# Patient Record
Sex: FEMALE | Race: BLACK OR AFRICAN AMERICAN | Employment: FULL TIME | URBAN - METROPOLITAN AREA
[De-identification: names, ages, dates, MRNs, and addresses within clinical notes are randomized per-mention and may not be internally consistent; named-entity substitution may affect disease eponyms.]

---

## 2017-01-16 DIAGNOSIS — R73.03 PREDIABETES: ICD-10-CM

## 2017-01-16 RX ORDER — LANCETS
EACH MISCELLANEOUS
Qty: 200 PACKAGE | Refills: 4 | Status: SHIPPED | OUTPATIENT
Start: 2017-01-16 | End: 2018-04-03 | Stop reason: SDUPTHER

## 2017-01-26 RX ORDER — PEN NEEDLE, DIABETIC 30 GX3/16"
NEEDLE, DISPOSABLE MISCELLANEOUS
Qty: 50 PEN NEEDLE | Refills: 6 | Status: SHIPPED | OUTPATIENT
Start: 2017-01-26 | End: 2019-05-08

## 2017-01-26 NOTE — TELEPHONE ENCOUNTER
----- Message from [address] [place] [address]. Garcia sent at 1/26/2017  8:39 AM EST -----  Regarding: RE: Non-Urgent Medical Question  Contact: [phone]  I have another question. I talk to another doctor who was happy to see I was taken [place]. I told her I didn't see it working for me as I told you before but that I also get bruise from the shots. She suggested I used a longer needle. I am using BD ultra-fine pen needles Sana 4m x 32g  can you please send a larger size to [facility] to try out. I started going to the gym and is still taking this medicine but I'm not showing any change in my weight. I would like to try another size before giving up on Saxenda. The doctor said that my belly fat may be to much for the needle in so many words. ----- Message -----  From: Roddy Johnson MD  Sent: 1/20/2017  5:24 PM EST  To: Jose G Cue  Subject: RE: Non-Urgent Medical Question    Yes, if you can tolerate the pain   Just make sure that they do it aseptic    Dr. Jorden Calix    ----- Message -----     From: Nohemy Whitaker     Sent: 1/20/2017  3:44 PM EST       To: Roddy Johnson MD  Subject: Non-Urgent Medical Mary Dura year Dr. Rona Huerta, do you think it will be okay for me to get a tattoo?

## 2017-01-27 ENCOUNTER — OFFICE VISIT (OUTPATIENT)
Dept: ENDOCRINOLOGY | Age: 42
End: 2017-01-27

## 2017-01-27 VITALS
HEART RATE: 83 BPM | TEMPERATURE: 97.1 F | RESPIRATION RATE: 20 BRPM | OXYGEN SATURATION: 97 % | BODY MASS INDEX: 45.32 KG/M2 | SYSTOLIC BLOOD PRESSURE: 124 MMHG | DIASTOLIC BLOOD PRESSURE: 86 MMHG | WEIGHT: 272 LBS | HEIGHT: 65 IN

## 2017-01-27 DIAGNOSIS — E03.9 ACQUIRED HYPOTHYROIDISM: Primary | ICD-10-CM

## 2017-01-27 DIAGNOSIS — I10 ESSENTIAL HYPERTENSION: ICD-10-CM

## 2017-01-27 DIAGNOSIS — R73.03 PREDIABETES: ICD-10-CM

## 2017-01-27 DIAGNOSIS — E66.09 NON MORBID OBESITY DUE TO EXCESS CALORIES: ICD-10-CM

## 2017-01-27 DIAGNOSIS — E28.2 PCOS (POLYCYSTIC OVARIAN SYNDROME): ICD-10-CM

## 2017-01-27 RX ORDER — PEN NEEDLE, DIABETIC 29 G X1/2"
NEEDLE, DISPOSABLE MISCELLANEOUS
COMMUNITY
Start: 2017-01-25 | End: 2019-05-08 | Stop reason: ALTCHOICE

## 2017-01-27 RX ORDER — SERTRALINE HYDROCHLORIDE 25 MG/1
TABLET, FILM COATED ORAL
Refills: 5 | COMMUNITY
Start: 2016-11-15 | End: 2017-05-17 | Stop reason: DRUGHIGH

## 2017-01-27 NOTE — PROGRESS NOTES
HISTORY OF PRESENT ILLNESS   Carole Frye is a 37 y.o. female. HPI   F/u for glucose intolerance, obesity  and Pcos after sept 28 2016   Finally got  saxenda approved     Weight lost 7 lbs     Periods are regular   Not eager to get pregnant at all           Review of Systems   Constitutional: Negative. HENT: Negative. Eyes: Negative for pain and redness. Respiratory: Negative. Cardiovascular: Negative for chest pain, palpitations and leg swelling. Gastrointestinal: Negative. Negative for constipation. Genitourinary: Negative. Musculoskeletal: Negative for myalgias. Skin: Negative. Neurological: Negative. Endo/Heme/Allergies: Negative. Psychiatric/Behavioral: Negative for depression and memory loss. The patient does not have insomnia. Physical Exam   Constitutional: She is oriented to person, place, and time. She appears well-developed and well-nourished. HENT:   Head: Normocephalic. Eyes: Conjunctivae and extraocular motions are normal. Pupils are equal, round, and reactive to light. Neck: Normal range of motion. Neck supple. No JVD present. No tracheal deviation present. No thyromegaly present. Cardiovascular: Normal rate, regular rhythm and normal heart sounds. No murmur heard. Pulmonary/Chest: Breath sounds normal.   Abdominal: Soft. Bowel sounds are normal.   Musculoskeletal: Normal range of motion. Lymphadenopathy:   She has no cervical adenopathy. Neurological: She is alert and oriented to person, place, and time. She has normal reflexes. Skin: Skin is warm. Psychiatric: She has a normal mood and affect.          Lab Results  Component Value Date/Time   Hemoglobin A1c 5.8 10/27/2015 08:14 AM   Hemoglobin A1c 5.9 03/24/2015 09:04 AM   Hemoglobin A1c 5.9 07/15/2014 09:08 AM   Hemoglobin A1c, External 5.8 07/29/2016   Hemoglobin A1c, External 5.7 01/08/2016   Glucose 100 10/27/2015 08:14 AM   Glucose (POC) 135 03/17/2011 01:28 PM   Glucose  07/30/2015 04:33 PM   Microalb/Creat ratio (ug/mg creat.) 8.1 03/24/2015 09:04 AM   LDL, calculated 79 10/27/2015 08:14 AM   Creatinine 0.78 10/27/2015 08:14 AM      Lab Results  Component Value Date/Time   Cholesterol, total 176 10/27/2015 08:14 AM   HDL Cholesterol 62 10/27/2015 08:14 AM   LDL, calculated 79 10/27/2015 08:14 AM   Triglyceride 177 10/27/2015 08:14 AM       Lab Results  Component Value Date/Time   ALT 8 10/27/2015 08:14 AM   AST 15 10/27/2015 08:14 AM   Alk. phosphatase 44 10/27/2015 08:14 AM   Bilirubin, total 0.3 10/27/2015 08:14 AM             ASSESSMENT and PLAN      1. PCOS : A1C   Is  5.8 %    From July 2016 labs ;    5.8 %    From    Oct 2015  Compared to   5.2 %    From    July 2015 compared to  5.9 %  Again from  March 2015 compared to   From nov 2014 compared to   5.9 %  From July 2014 compared to  6 % from jan 2014 compared to 5.4 % from July 2013 compared to  6 % from jan 2013 ;  5.5 % from aug 2012 compared to last 5.4 % in jan 2012. Continue on  metformin 500 mg ( 2 pills- 1 gm) bid. She is not hopeful of getting  pregnant again. ( got  )  Dietary compliance she has to maintain      . 2. HTN : better controlled, on labetalol 200 mg tid, and diovan hctz 320/25  mg qd, add norvasc 5 mg a day   Infertility, secondary- s/p tubal adhesionolysis          3. Sub-clinical hypothyroidism :  changed to brand  unithroid 25 mcg 7 days a week , plus  sundays  take xtra pill      4. Obesity S/p LAP BANDING 2006  : belviq did nto help her , made it worse   Body mass index is 45.26 kg/(m^2). Juanynamita Dontasigifredo got covered       5.  Vit d def - otc supplements     > 50 % of time is spent on counseling     F/u in 6 months

## 2017-01-27 NOTE — PATIENT INSTRUCTIONS
SAXENDA  3 mg a day       Synthroid  25 mcg daily plus extra pill on sundays     Metformin 500 mg 2 pills twice a day with meals         Do not skip meals  Do not eat in between meals    Reduce carbs- pasta, rice, potatoes, bread   Do not drink juices or sodas  , CHEESE   Stop cheese, fried foods and flour products    Do not eat sugar free cookies and cakes   Do not eat peanut butter

## 2017-01-27 NOTE — PROGRESS NOTES
Wt Readings from Last 3 Encounters:   01/27/17 272 lb (123.4 kg)   09/28/16 279 lb (126.6 kg)   08/16/16 280 lb (127 kg)     Temp Readings from Last 3 Encounters:   01/27/17 97.1 °F (36.2 °C) (Oral)   09/28/16 98.2 °F (36.8 °C) (Oral)   08/16/16 98.6 °F (37 °C)     BP Readings from Last 3 Encounters:   01/27/17 124/86   09/28/16 117/77   08/16/16 120/86     Pulse Readings from Last 3 Encounters:   01/27/17 83   09/28/16 85   08/16/16 84     Lab Results   Component Value Date/Time    Hemoglobin A1c 5.8 10/27/2015 08:14 AM    Hemoglobin A1c (POC) 5.2 07/30/2015 04:41 PM    Hemoglobin A1c, External 5.8 07/29/2016

## 2017-01-27 NOTE — MR AVS SNAPSHOT
Visit Information Date & Time Provider Department Dept. Phone Encounter #  
 1/27/2017  3:45 PM Sudeep Donaldson MD [facility] [phone] [id number] Follow-up Instructions Return in about 3 months (around 4/27/2017). Upcoming Health Maintenance Date Due  
 FOOT EXAM Q1 10/22/1986 EYE EXAM RETINAL OR DILATED Q1 10/22/1986 Pneumococcal 19-64 Medium Risk (1 of 1 - PPSV23) 10/22/1995 DTaP/Tdap/Td series (1 - Tdap) 10/22/1997 PAP AKA CERVICAL CYTOLOGY 10/22/1997 MICROALBUMIN Q1 3/24/2016 INFLUENZA AGE 9 TO ADULT 8/1/2016 HEMOGLOBIN A1C Q6M 1/29/2017 LIPID PANEL Q1 7/29/2017 Allergies as of 1/27/2017  Review Complete On: 1/27/2017 By: Sudeep Donaldson MD  
  
 Severity Noted Reaction Type Reactions Pcn [Penicillins] High 10/27/2010    Swelling Reglan [Metoclopramide]  03/17/2011    Swelling Of tongue Current Immunizations  Never Reviewed No immunizations on file. Not reviewed this visit You Were Diagnosed With   
  
 Codes Comments Prediabetes    -  Primary ICD-10-CM: R73.03 
ICD-9-CM: 790.29 Non morbid obesity due to excess calories     ICD-10-CM: E66.09 
ICD-9-CM: 278.00 Acquired hypothyroidism     ICD-10-CM: E03.9 ICD-9-CM: 244.9 Essential hypertension     ICD-10-CM: I10 
ICD-9-CM: 401.9 Vitals BP Pulse Temp Resp Height(growth percentile) Weight(growth percentile) 124/86 83 97.1 °F (36.2 °C) (Oral) 20 5' 5\" (1.651 m) 272 lb (123.4 kg) LMP SpO2 BMI OB Status Smoking Status 01/27/2017 (Exact Date) 97% 45.26 kg/m2 Having regular periods Never Smoker BMI and BSA Data Body Mass Index Body Surface Area  
 45.26 kg/m 2 2.38 m 2 Preferred Pharmacy Pharmacy Name Phone [id number] [address] [phone] Your Updated Medication List  
  
   
This list is accurate as of: 1/27/17  4:35 PM.  Always use your most recent med list.  
  
  
  
  
 acyclovir 400 mg tablet Commonly known as:  ZOVIRAX Take 400 mg by mouth daily. amLODIPine 5 mg tablet Commonly known as:  Mendez Jeffery Take 1 Tab by mouth daily. cyclobenzaprine 10 mg tablet Commonly known as:  FLEXERIL Take 1 Tab by mouth as needed. FISH OIL 1,000 mg Cap Generic drug:  omega-3 fatty acids-vitamin e Take 1 Cap by mouth. glucose blood VI test strips strip Commonly known as:  Brooke Cruise Test up to 2 times daily. Dx code E11.65 * BD INSULIN PEN NEEDLE UF ORIG 29 gauge x 1/2\" Ndle Generic drug:  Insulin Needles (Disposable) * Insulin Needles (Disposable) 31 gauge x 5/16\" Ndle Commonly known as:  BD INSULIN PEN NEEDLE UF SHORT Use once daily. Dx code E66.01  
  
 labetalol 200 mg tablet Commonly known as:  Suella Cosier Take 1 Tab by mouth three (3) times daily. Lancets Misc Test up to 2 times daily. Dx code E11.65  
  
 levothyroxine 25 mcg tablet Commonly known as:  SYNTHROID Take 1 Tab by mouth Daily (before breakfast). AND AN EXTRA PILL EVERY SUNDAY * liraglutide 3 mg/0.5 mL (18 mg/3 mL) pen Commonly known as:  SAXENDA  
0.5 mL by SubCUTAneous route daily. sample * liraglutide 3 mg/0.5 mL (18 mg/3 mL) pen Commonly known as:  SAXENDA  
0.5 mL by SubCUTAneous route daily. metFORMIN 500 mg tablet Commonly known as:  GLUCOPHAGE Take 2 Tabs by mouth two (2) times daily (with meals). naproxen 500 mg tablet Commonly known as:  NAPROSYN  
TAKE 1 TABLET BY MOUTH TWICE A DAY AS NEEDED PRENATAL 1+1 PO Take  by mouth daily. sertraline 25 mg tablet Commonly known as:  ZOLOFT  
TAKE 1 TABLET EVERY DAY  
  
 traMADol 50 mg tablet Commonly known as:  ULTRAM  
TAKE 1 TABLET BY MOUTH EVERY 4 HOURS AS NEEDED FOR PAIN  
  
 valsartan-hydroCHLOROthiazide 320-25 mg per tablet Commonly known as:  DIOVAN-HCT Take 1 Tab by mouth daily. * Notice: This list has 4 medication(s) that are the same as other medications prescribed for you. Read the directions carefully, and ask your doctor or other care provider to review them with you. Follow-up Instructions Return in about 3 months (around 4/27/2017). Patient Instructions SAXENDA  3 mg a day Synthroid  25 mcg daily plus extra pill on sundays Metformin 500 mg 2 pills twice a day with meals Do not skip meals Do not eat in between meals Reduce carbs- pasta, rice, potatoes, bread Do not drink juices or sodas  , CHEESE Stop cheese, fried foods and flour products Do not eat sugar free cookies and cakes Do not eat peanut butter Introducing [facility] & HEALTH SERVICES! Dear Kusum Escalera: Thank you for requesting a Medisse account. Our records indicate that you already have an active Medisse account. You can access your account anytime at [email or web address] Did you know that you can access your hospital and ER discharge instructions at any time in Medisse? You can also review all of your test results from your hospital stay or ER visit. Additional Information If you have questions, please visit the Frequently Asked Questions section of the Medisse website at [email or web address] Remember, Medisse is NOT to be used for urgent needs. For medical emergencies, dial 911. Now available from your iPhone and Android! Please provide this summary of care documentation to your next provider. Your primary care clinician is listed as Emil Gipson. If you have any questions after today's visit, please call [phone].

## 2017-01-28 LAB
EST. AVERAGE GLUCOSE BLD GHB EST-MCNC: 120 MG/DL
HBA1C MFR BLD: 5.8 % (ref 4.8–5.6)
T4 FREE SERPL-MCNC: 1.11 NG/DL (ref 0.82–1.77)
TSH SERPL DL<=0.005 MIU/L-ACNC: 2.43 UIU/ML (ref 0.45–4.5)

## 2017-02-01 NOTE — TELEPHONE ENCOUNTER
----- Message from [address] [place] [address]. Jose sent at 2/1/2017  8:40 AM EST -----  Regarding: Prescription Question  Contact: [phone]  Good morning,  Please send a prescription for my test strips for the Verio One touch meter. Thank you   Tobias Nava.

## 2017-04-03 ENCOUNTER — OFFICE VISIT (OUTPATIENT)
Dept: SURGERY | Age: 42
End: 2017-04-03

## 2017-04-03 VITALS
DIASTOLIC BLOOD PRESSURE: 90 MMHG | OXYGEN SATURATION: 98 % | HEART RATE: 80 BPM | SYSTOLIC BLOOD PRESSURE: 140 MMHG | HEIGHT: 65 IN | BODY MASS INDEX: 47.15 KG/M2 | WEIGHT: 283 LBS | TEMPERATURE: 98.7 F | RESPIRATION RATE: 20 BRPM

## 2017-04-03 DIAGNOSIS — R63.5 WEIGHT GAIN: ICD-10-CM

## 2017-04-03 DIAGNOSIS — Z46.51 ADMISSION FOR ADJUSTMENT OF GASTRIC LAP BAND: Primary | ICD-10-CM

## 2017-04-03 DIAGNOSIS — E66.01 MORBID OBESITY WITH BMI OF 45.0-49.9, ADULT (HCC): ICD-10-CM

## 2017-04-03 NOTE — PATIENT INSTRUCTIONS
Consider attending an informational seminar about gastric bypass. If you are interested, e-mail jia Donahue [email or web address] an appointment with the dietician, please call or email   Charity Cruz RD at:    [phone]  [email or web address]      Juan Manuel [facility]  [phone]      Post Lap Band Adjustment Instructions    Your band is now more restrictive. Some swelling can occur in the band following a fill. Therefore, you should eat :    Full liquids for 12-24 hours  Soft & mushy foods for 2 days  Resume regular food on the 4th day. ** All meals should fit in a 4 ounce cup. (1/2 cup container) **  Choose foods that require chewing, ideally foods rich in fiber & protein. Avoid fatty & sugary foods. Avoid snack food items. Eating tips:    Put down your fork between bites. Do not talk and eat at the same time. No drinking 30 minutes before or one hour after a meal.    ** Call for an evaluation if you develop new reflux (heartburn), a night time cough or inability to tolerate solids foods. **    Your next regular follow-up appointment is in: 4 to 6 weeks. Please call the office at [phone] to schedule this appointment. If you have any problems before your scheduled appointment, don't wait. Call the office when you are having the problem. They may need to see you before your scheduled appointment.
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
## 2017-04-03 NOTE — PROGRESS NOTES
1. Have you been to the ER, urgent care clinic since your last visit? Hospitalized since your last visit? No    2. Have you seen or consulted any other health care providers outside of the [address] [place] [address] since your last visit? Include any pap smears or colon screening.  No

## 2017-04-03 NOTE — LETTER
4/3/2017 3:52 PM 
 
Ms. Kiran Mckoy [phone] [place] To Whom It May Concern: 
 
Kiran Mckoy is under the care of [facility] [phone]. Due to medical issues and prior surgery, she may require more frequent bathroom visits during the work day. If there are questions or concerns please have the patient contact our office.  
 
Sincerely, 
 
 
Amanda Bejarano NP

## 2017-04-03 NOTE — MR AVS SNAPSHOT
Visit Information Date & Time Provider Department Dept. Phone Encounter #  
 4/3/2017  3:00 PM Delmi Mccray NP [facility] GENERAL SURGERY [phone] [id number] Your Appointments 5/8/2017  3:45 PM  
ROUTINE CARE with Nolberto Howell MD  
[facility]) Appt Note: 3 mo fu  
 [address] [facility] [address]  
[phone]  
  
   
 [address] [place] [address] Upcoming Health Maintenance Date Due  
 FOOT EXAM Q1 10/22/1986 EYE EXAM RETINAL OR DILATED Q1 10/22/1986 Pneumococcal 19-64 Medium Risk (1 of 1 - PPSV23) 10/22/1995 DTaP/Tdap/Td series (1 - Tdap) 10/22/1997 PAP AKA CERVICAL CYTOLOGY 10/22/1997 MICROALBUMIN Q1 3/24/2016 INFLUENZA AGE 9 TO ADULT 8/1/2016 HEMOGLOBIN A1C Q6M 7/27/2017 LIPID PANEL Q1 7/29/2017 Allergies as of 4/3/2017  Review Complete On: 4/3/2017 By: Lauren Neil LPN Severity Noted Reaction Type Reactions Pcn [Penicillins] High 10/27/2010    Swelling Hydralazine  04/03/2017    Other (comments) Lupus reaction Reglan [Metoclopramide]  03/17/2011    Swelling Of tongue Current Immunizations  Never Reviewed No immunizations on file. Not reviewed this visit Vitals BP Pulse Temp Resp Height(growth percentile) Weight(growth percentile) 140/90 (BP 1 Location: Right arm, BP Patient Position: Sitting) 80 98.7 °F (37.1 °C) 20 5' 5\" (1.651 m) 283 lb (128.4 kg) SpO2 BMI OB Status Smoking Status 98% 47.09 kg/m2 Having regular periods Never Smoker BMI and BSA Data Body Mass Index Body Surface Area 47.09 kg/m 2 2.43 m 2 Preferred Pharmacy Pharmacy Name Phone [id number] Latesha ISA Nicole - [phone] [phone] Your Updated Medication List  
  
   
This list is accurate as of: 4/3/17  3:49 PM.  Always use your most recent med list.  
  
  
  
  
 acyclovir 400 mg tablet Commonly known as:  ZOVIRAX Take 400 mg by mouth daily. amLODIPine 5 mg tablet Commonly known as:  Cecilia Medici Take 1 Tab by mouth daily. cyclobenzaprine 10 mg tablet Commonly known as:  FLEXERIL Take 1 Tab by mouth as needed. FISH OIL 1,000 mg Cap Generic drug:  omega-3 fatty acids-vitamin e Take 1 Cap by mouth. glucose blood VI test strips strip Commonly known as:  Heather Adam Test up to 2 times daily. Dx code E11.65 * BD INSULIN PEN NEEDLE UF ORIG 29 gauge x 1/2\" Ndle Generic drug:  Insulin Needles (Disposable) * Insulin Needles (Disposable) 31 gauge x 5/16\" Ndle Commonly known as:  BD INSULIN PEN NEEDLE UF SHORT Use once daily. Dx code E66.01  
  
 labetalol 200 mg tablet Commonly known as:  Niall Gosling Take 1 Tab by mouth three (3) times daily. Lancets Misc Test up to 2 times daily. Dx code E11.65  
  
 levothyroxine 25 mcg tablet Commonly known as:  SYNTHROID Take 1 Tab by mouth Daily (before breakfast). AND AN EXTRA PILL EVERY SUNDAY  
  
 liraglutide 3 mg/0.5 mL (18 mg/3 mL) pen Commonly known as:  SAXENDA  
0.5 mL by SubCUTAneous route daily. sample  
  
 metFORMIN 500 mg tablet Commonly known as:  GLUCOPHAGE Take 2 Tabs by mouth two (2) times daily (with meals). naproxen 500 mg tablet Commonly known as:  NAPROSYN  
TAKE 1 TABLET BY MOUTH TWICE A DAY AS NEEDED PRENATAL 1+1 PO Take  by mouth daily. sertraline 25 mg tablet Commonly known as:  ZOLOFT  
TAKE 1 TABLET EVERY DAY  
  
 traMADol 50 mg tablet Commonly known as:  ULTRAM  
TAKE 1 TABLET BY MOUTH EVERY 4 HOURS AS NEEDED FOR PAIN  
  
 valsartan-hydroCHLOROthiazide 320-25 mg per tablet Commonly known as:  DIOVAN-HCT Take 1 Tab by mouth daily. * Notice: This list has 2 medication(s) that are the same as other medications prescribed for you.  Read the directions carefully, and ask your doctor or other care provider to review them with you. Patient Instructions Consider attending an informational seminar about gastric bypass. If you are interested, e-mail [email or web address] [place] an appointment with the dietician, please call or email Lizzeth СветланаANDER at: 
 
[phone] [email or web address] [facility] Surgery at [facility] 
[phone] Post Lap Band Adjustment Instructions Your band is now more restrictive. Some swelling can occur in the band following a fill. Therefore, you should eat : 
 
Full liquids for 12-24 hours Soft & mushy foods for 2 days Resume regular food on the 4th day. ** All meals should fit in a 4 ounce cup. (1/2 cup container) ** Choose foods that require chewing, ideally foods rich in fiber & protein. Avoid fatty & sugary foods. Avoid snack food items. Eating tips: 
 
Put down your fork between bites. Do not talk and eat at the same time. No drinking 30 minutes before or one hour after a meal. 
 
** Call for an evaluation if you develop new reflux (heartburn), a night time cough or inability to tolerate solids foods. ** Your next regular follow-up appointment is in: 4 to 6 weeks. Please call the office at [phone] to schedule this appointment. If you have any problems before your scheduled appointment, don't wait. Call the office when you are having the problem. They may need to see you before your scheduled appointment. Introducing [facility] & HEALTH SERVICES! Steph Cole: Thank you for requesting a CyberSettle account. Our records indicate that you already have an active CyberSettle account. You can access your account anytime at [email or web address] Did you know that you can access your hospital and ER discharge instructions at any time in CyberSettle? You can also review all of your test results from your hospital stay or ER visit. Additional Information If you have questions, please visit the Frequently Asked Questions section of the [employer] website at [email or web address] Remember, Empiribox is NOT to be used for urgent needs. For medical emergencies, dial 911. Now available from your iPhone and Android! Please provide this summary of care documentation to your next provider. Your primary care clinician is listed as Kike Suarez. If you have any questions after today's visit, please call [phone].

## 2017-05-17 ENCOUNTER — OFFICE VISIT (OUTPATIENT)
Dept: ENDOCRINOLOGY | Age: 42
End: 2017-05-17

## 2017-05-17 VITALS
HEIGHT: 65 IN | WEIGHT: 279 LBS | SYSTOLIC BLOOD PRESSURE: 139 MMHG | RESPIRATION RATE: 20 BRPM | HEART RATE: 73 BPM | BODY MASS INDEX: 46.48 KG/M2 | TEMPERATURE: 97.5 F | DIASTOLIC BLOOD PRESSURE: 91 MMHG | OXYGEN SATURATION: 97 %

## 2017-05-17 DIAGNOSIS — R73.03 PREDIABETES: ICD-10-CM

## 2017-05-17 DIAGNOSIS — E28.2 PCOS (POLYCYSTIC OVARIAN SYNDROME): Primary | ICD-10-CM

## 2017-05-17 DIAGNOSIS — E66.01 MORBID OBESITY DUE TO EXCESS CALORIES (HCC): ICD-10-CM

## 2017-05-17 DIAGNOSIS — I10 ESSENTIAL HYPERTENSION: ICD-10-CM

## 2017-05-17 DIAGNOSIS — E28.2 PCOS (POLYCYSTIC OVARIAN SYNDROME): ICD-10-CM

## 2017-05-17 RX ORDER — LEVOTHYROXINE SODIUM 50 UG/1
TABLET ORAL
Qty: 90 TAB | Refills: 4 | Status: SHIPPED | OUTPATIENT
Start: 2017-05-17 | End: 2018-04-03 | Stop reason: SDUPTHER

## 2017-05-17 RX ORDER — SERTRALINE HYDROCHLORIDE 50 MG/1
1 TABLET, FILM COATED ORAL DAILY
COMMUNITY
Start: 2017-05-15

## 2017-05-17 RX ORDER — METFORMIN HYDROCHLORIDE 500 MG/1
1000 TABLET ORAL 2 TIMES DAILY WITH MEALS
Qty: 360 TAB | Refills: 4 | Status: SHIPPED | OUTPATIENT
Start: 2017-05-17 | End: 2018-04-03 | Stop reason: SDUPTHER

## 2017-05-17 NOTE — MR AVS SNAPSHOT
Visit Information Date & Time Provider Department Dept. Phone Encounter #  
 5/17/2017 11:45 AM Kamla Mcclure MD [facility] [phone] [id number] Follow-up Instructions Return in about 4 months (around 9/17/2017). Upcoming Health Maintenance Date Due DTaP/Tdap/Td series (1 - Tdap) 10/22/1997 PAP AKA CERVICAL CYTOLOGY 10/22/1997 INFLUENZA AGE 9 TO ADULT 8/1/2017 Allergies as of 5/17/2017  Review Complete On: 5/17/2017 By: Kamla Mcclure MD  
  
 Severity Noted Reaction Type Reactions Pcn [Penicillins] High 10/27/2010    Swelling Hydralazine  04/03/2017    Other (comments) Lupus reaction Reglan [Metoclopramide]  03/17/2011    Swelling Of tongue Current Immunizations  Never Reviewed No immunizations on file. Not reviewed this visit You Were Diagnosed With   
  
 Codes Comments PCOS (polycystic ovarian syndrome)    -  Primary ICD-10-CM: E28.2 ICD-9-CM: 256.4 Essential hypertension     ICD-10-CM: I10 
ICD-9-CM: 401.9 Morbid obesity due to excess calories (HCC)     ICD-10-CM: E66.01 
ICD-9-CM: 278.01 Prediabetes     ICD-10-CM: R73.03 
ICD-9-CM: 790.29 Vitals BP Pulse Temp Resp Height(growth percentile) Weight(growth percentile) (!) 139/91 73 97.5 °F (36.4 °C) (Oral) 20 5' 5\" (1.651 m) 279 lb (126.6 kg) LMP SpO2 BMI OB Status Smoking Status 05/08/2017 (Exact Date) 97% 46.43 kg/m2 Having regular periods Never Smoker BMI and BSA Data Body Mass Index Body Surface Area  
 46.43 kg/m 2 2.41 m 2 Preferred Pharmacy Pharmacy Name Phone [id number] [address] [phone] Your Updated Medication List  
  
   
This list is accurate as of: 5/17/17 12:36 PM.  Always use your most recent med list.  
  
  
  
  
 acyclovir 400 mg tablet Commonly known as:  ZOVIRAX Take 400 mg by mouth daily. amLODIPine 5 mg tablet Commonly known as:  Sameul Crow Take 1 Tab by mouth daily. cyclobenzaprine 10 mg tablet Commonly known as:  FLEXERIL Take 1 Tab by mouth as needed. glucose blood VI test strips strip Commonly known as:  Tiffany Benavides Test up to 2 times daily. Dx code E11.65 * BD INSULIN PEN NEEDLE UF ORIG 29 gauge x 1/2\" Ndle Generic drug:  Insulin Needles (Disposable) * Insulin Needles (Disposable) 31 gauge x 5/16\" Ndle Commonly known as:  BD INSULIN PEN NEEDLE UF SHORT Use once daily. Dx code E66.01  
  
 labetalol 200 mg tablet Commonly known as:  Alberto Rene Take 1 Tab by mouth three (3) times daily. Lancets Misc Test up to 2 times daily. Dx code E11.65  
  
 levothyroxine 25 mcg tablet Commonly known as:  SYNTHROID Take 1 Tab by mouth Daily (before breakfast). AND AN EXTRA PILL EVERY SUNDAY  
  
 liraglutide 3 mg/0.5 mL (18 mg/3 mL) pen Commonly known as:  SAXENDA  
0.5 mL by SubCUTAneous route daily. sample  
  
 metFORMIN 500 mg tablet Commonly known as:  GLUCOPHAGE Take 2 Tabs by mouth two (2) times daily (with meals). naproxen 500 mg tablet Commonly known as:  NAPROSYN  
TAKE 1 TABLET BY MOUTH TWICE A DAY AS NEEDED PRENATAL 1+1 PO Take  by mouth daily. sertraline 50 mg tablet Commonly known as:  ZOLOFT Take 1 Tab by mouth daily. traMADol 50 mg tablet Commonly known as:  ULTRAM  
TAKE 1 TABLET BY MOUTH EVERY 4 HOURS AS NEEDED FOR PAIN  
  
 valsartan-hydroCHLOROthiazide 320-25 mg per tablet Commonly known as:  DIOVAN-HCT Take 1 Tab by mouth daily. * Notice: This list has 2 medication(s) that are the same as other medications prescribed for you. Read the directions carefully, and ask your doctor or other care provider to review them with you. Follow-up Instructions Return in about 4 months (around 9/17/2017). Patient Instructions SAXENDA  3 mg a day Increase  Synthroid  50 mcg daily except sundays Metformin 500 mg 2 pills twice a day with meals Do not skip meals Do not eat in between meals Reduce carbs- pasta, rice, potatoes, bread Do not drink juices or sodas  , CHEESE Stop cheese, fried foods and flour products Do not eat sugar free cookies and cakes Do not eat peanut butter Introducing [facility]! Dear Orly Wells: Thank you for requesting a [employer] account. Our records indicate that you already have an active [employer] account. You can access your account anytime at [email or web address] Did you know that you can access your hospital and ER discharge instructions at any time in [employer]? You can also review all of your test results from your hospital stay or ER visit. Additional Information If you have questions, please visit the Frequently Asked Questions section of the [employer] website at [email or web address] Remember, [employer] is NOT to be used for urgent needs. For medical emergencies, dial 911. Now available from your iPhone and Android! Please provide this summary of care documentation to your next provider. Your primary care clinician is listed as Kelsey Delgado. If you have any questions after today's visit, please call [phone].

## 2017-05-17 NOTE — PATIENT INSTRUCTIONS
SAXENDA  3 mg a day       Increase  Synthroid  50 mcg daily except sundays     Metformin 500 mg 2 pills twice a day with meals         Do not skip meals  Do not eat in between meals    Reduce carbs- pasta, rice, potatoes, bread   Do not drink juices or sodas  , CHEESE   Stop cheese, fried foods and flour products    Do not eat sugar free cookies and cakes   Do not eat peanut butter

## 2017-05-17 NOTE — PROGRESS NOTES
HISTORY OF PRESENT ILLNESS   Jackelyn Martinez is a 37 y.o. female. HPI   F/u for glucose intolerance, obesity  and Pcos after jan 2017   Finally got  saxenda approved , but she stopped     Weight lost 3 lbs     Periods are regular   Not eager to get pregnant at all           Review of Systems   Constitutional: Negative. HENT: Negative. Eyes: Negative for pain and redness. Respiratory: Negative. Cardiovascular: Negative for chest pain, palpitations and leg swelling. Gastrointestinal: Negative. Negative for constipation. Genitourinary: Negative. Musculoskeletal: Negative for myalgias. Skin: Negative. Neurological: Negative. Endo/Heme/Allergies: Negative. Psychiatric/Behavioral: Negative for depression and memory loss. The patient does not have insomnia. Physical Exam   Constitutional: She is oriented to person, place, and time. She appears well-developed and well-nourished. HENT:   Head: Normocephalic. Eyes: Conjunctivae and extraocular motions are normal. Pupils are equal, round, and reactive to light. Neck: Normal range of motion. Neck supple. No JVD present. No tracheal deviation present. No thyromegaly present. Cardiovascular: Normal rate, regular rhythm and normal heart sounds. No murmur heard. Pulmonary/Chest: Breath sounds normal.   Abdominal: Soft. Bowel sounds are normal.   Musculoskeletal: Normal range of motion. Lymphadenopathy:   She has no cervical adenopathy. Neurological: She is alert and oriented to person, place, and time. She has normal reflexes. Skin: Skin is warm. Psychiatric: She has a normal mood and affect.          Lab Results   Component Value Date/Time    Hemoglobin A1c 5.9 05/12/2017 09:50 AM    Hemoglobin A1c 5.8 01/27/2017 12:07 PM    Hemoglobin A1c 5.8 10/27/2015 08:14 AM    Hemoglobin A1c, External 5.8 07/29/2016    Hemoglobin A1c, External 5.7 01/08/2016    Glucose 101 05/12/2017 09:50 AM    Glucose (POC) 135 03/17/2011 01:28 PM Glucose  07/30/2015 04:33 PM    Microalb/Creat ratio (ug/mg creat.) 5.7 05/12/2017 09:50 AM    LDL, calculated 98 05/12/2017 09:50 AM    Creatinine 0.70 05/12/2017 09:50 AM      Lab Results   Component Value Date/Time    Cholesterol, total 183 05/12/2017 09:50 AM    HDL Cholesterol 58 05/12/2017 09:50 AM    LDL, calculated 98 05/12/2017 09:50 AM    Triglyceride 136 05/12/2017 09:50 AM       Lab Results   Component Value Date/Time    ALT (SGPT) 13 05/12/2017 09:50 AM    AST (SGOT) 12 05/12/2017 09:50 AM    Alk. phosphatase 54 05/12/2017 09:50 AM    Bilirubin, total 0.2 05/12/2017 09:50 AM             ASSESSMENT and PLAN      1. PCOS : A1C   Is  5.9 %     From   May 2017      5.8 %    From July 2016 labs ;    5.8 %    From    Oct 2015  Compared to   5.2 %    From    July 2015 compared to  5.9 %  Again from  March 2015 compared to   From nov 2014 compared to   5.9 %  From July 2014 compared to  6 % from jan 2014 compared to 5.4 % from July 2013 compared to  6 % from jan 2013 ;  5.5 % from aug 2012 compared to last 5.4 % in jan 2012. Continue on  metformin 500 mg ( 2 pills- 1 gm) bid. She is not hopeful of getting  pregnant again. ( got  )  Dietary compliance she has to maintain      . 2. HTN : better controlled, on labetalol 200 mg tid, and diovan hctz 320/25  mg qd, add norvasc 5 mg a day   Infertility, secondary- s/p tubal adhesionolysis          3. Sub-clinical hypothyroidism :  Changing to  Synthroid 50 mcg 6 days a week       4. Obesity S/p LAP BANDING 2006  : belviq did nto help her , made it worse   Body mass index is 46.43 kg/(m^2). Truong Dickson got covered   She is not using it as she had some bruises   Asked her to stop fish oil supplements      5.  Vit d def - otc supplements     > 50 % of time is spent on counseling     F/u in 6 months

## 2017-05-17 NOTE — PROGRESS NOTES
Wt Readings from Last 3 Encounters:   05/17/17 279 lb (126.6 kg)   04/03/17 283 lb (128.4 kg)   01/27/17 272 lb (123.4 kg)     Temp Readings from Last 3 Encounters:   05/17/17 97.5 °F (36.4 °C) (Oral)   04/03/17 98.7 °F (37.1 °C)   01/27/17 97.1 °F (36.2 °C) (Oral)     BP Readings from Last 3 Encounters:   05/17/17 (!) 139/91   04/03/17 140/90   01/27/17 124/86     Pulse Readings from Last 3 Encounters:   05/17/17 73   04/03/17 80   01/27/17 83     Lab Results   Component Value Date/Time    Hemoglobin A1c 5.9 05/12/2017 09:50 AM    Hemoglobin A1c (POC) 5.2 07/30/2015 04:41 PM    Hemoglobin A1c, External 5.8 07/29/2016     Last Podiatry March 2017  Last Eye exam 2017

## 2017-09-18 ENCOUNTER — OFFICE VISIT (OUTPATIENT)
Dept: ENDOCRINOLOGY | Age: 42
End: 2017-09-18

## 2017-09-18 VITALS
TEMPERATURE: 98.1 F | HEIGHT: 65 IN | RESPIRATION RATE: 20 BRPM | HEART RATE: 71 BPM | DIASTOLIC BLOOD PRESSURE: 96 MMHG | WEIGHT: 288 LBS | SYSTOLIC BLOOD PRESSURE: 144 MMHG | OXYGEN SATURATION: 98 % | BODY MASS INDEX: 47.98 KG/M2

## 2017-09-18 DIAGNOSIS — E03.9 ACQUIRED HYPOTHYROIDISM: ICD-10-CM

## 2017-09-18 DIAGNOSIS — E11.65 TYPE 2 DIABETES MELLITUS WITH HYPERGLYCEMIA, WITHOUT LONG-TERM CURRENT USE OF INSULIN (HCC): ICD-10-CM

## 2017-09-18 DIAGNOSIS — E28.2 PCOS (POLYCYSTIC OVARIAN SYNDROME): Primary | ICD-10-CM

## 2017-09-18 RX ORDER — NORETHINDRONE ACETATE AND ETHINYL ESTRADIOL, ETHINYL ESTRADIOL AND FERROUS FUMARATE 1MG-10(24)
1 KIT ORAL DAILY
Refills: 4 | COMMUNITY
Start: 2017-08-21 | End: 2018-04-03 | Stop reason: ALTCHOICE

## 2017-09-18 NOTE — PATIENT INSTRUCTIONS
STOP      SAXENDA  3 mg a day     Q-symia   7.5 - 46   Mg    For first month     11.25 - 93 mg    Second  month on. ..     Synthroid  50 mcg daily except sundays   Metformin 500 mg 2 pills twice a day with meals     =---------------------------------------------------------------------    Low carbs diet   30 gm per  Meal         ---------------------------------------------------------------------------------------------------------        Do not skip meals  Do not eat in between meals    Reduce carbs- pasta, rice, potatoes, bread   Do not drink juices or sodas  , CHEESE   Stop cheese, fried foods and flour products    Do not eat sugar free cookies and cakes   Do not eat peanut butter

## 2017-09-18 NOTE — PROGRESS NOTES
Wt Readings from Last 3 Encounters:   09/18/17 288 lb (130.6 kg)   05/17/17 279 lb (126.6 kg)   04/03/17 283 lb (128.4 kg)     Temp Readings from Last 3 Encounters:   09/18/17 98.1 °F (36.7 °C) (Oral)   05/17/17 97.5 °F (36.4 °C) (Oral)   04/03/17 98.7 °F (37.1 °C)     BP Readings from Last 3 Encounters:   09/18/17 (!) 144/96   05/17/17 (!) 139/91   04/03/17 140/90     Pulse Readings from Last 3 Encounters:   09/18/17 71   05/17/17 73   04/03/17 80     Lab Results   Component Value Date/Time    Hemoglobin A1c 5.9 05/12/2017 09:50 AM    Hemoglobin A1c (POC) 5.2 07/30/2015 04:41 PM    Hemoglobin A1c, External 5.8 07/29/2016

## 2017-09-18 NOTE — MR AVS SNAPSHOT
Visit Information Date & Time Provider Department Dept. Phone Encounter #  
 9/18/2017  3:45 PM Claudene Maffucci, MD [facility] [phone] [id number] Follow-up Instructions Return in about 4 months (around 1/18/2018). Upcoming Health Maintenance Date Due DTaP/Tdap/Td series (1 - Tdap) 10/22/1997 PAP AKA CERVICAL CYTOLOGY 10/22/1997 INFLUENZA AGE 9 TO ADULT 8/1/2017 Allergies as of 9/18/2017  Review Complete On: 9/18/2017 By: Claudene Maffucci, MD  
  
 Severity Noted Reaction Type Reactions Pcn [Penicillins] High 10/27/2010    Swelling Hydralazine  04/03/2017    Other (comments) Lupus reaction Reglan [Metoclopramide]  03/17/2011    Swelling Of tongue Current Immunizations  Never Reviewed No immunizations on file. Not reviewed this visit You Were Diagnosed With   
  
 Codes Comments PCOS (polycystic ovarian syndrome)    -  Primary ICD-10-CM: E28.2 ICD-9-CM: 256.4 Type 2 diabetes mellitus with hyperglycemia, without long-term current use of insulin (HCC)     ICD-10-CM: E11.65 ICD-9-CM: 250.00, 790.29 Acquired hypothyroidism     ICD-10-CM: E03.9 ICD-9-CM: 552. 9 Vitals BP Pulse Temp Resp Height(growth percentile) Weight(growth percentile) (!) 144/96 71 98.1 °F (36.7 °C) (Oral) 20 5' 5\" (1.651 m) 288 lb (130.6 kg) LMP SpO2 BMI OB Status Smoking Status 09/11/2017 (Exact Date) 98% 47.93 kg/m2 Having regular periods Never Smoker BMI and BSA Data Body Mass Index Body Surface Area  
 47.93 kg/m 2 2.45 m 2 Preferred Pharmacy Pharmacy Name Phone [id number] Radha Al[address] [phone] Your Updated Medication List  
  
   
This list is accurate as of: 9/18/17  5:16 PM.  Always use your most recent med list.  
  
  
  
  
 acyclovir 400 mg tablet Commonly known as:  ZOVIRAX Take 400 mg by mouth daily. amLODIPine 5 mg tablet Commonly known as:  Berry Barges Take 1 Tab by mouth daily. cyclobenzaprine 10 mg tablet Commonly known as:  FLEXERIL Take 1 Tab by mouth as needed. glucose blood VI test strips strip Commonly known as:  Jorje [place] Test up to 2 times daily. Dx code E11.65 * BD INSULIN PEN NEEDLE UF ORIG 29 gauge x 1/2\" Ndle Generic drug:  Insulin Needles (Disposable) * Insulin Needles (Disposable) 31 gauge x 5/16\" Ndle Commonly known as:  BD INSULIN PEN NEEDLE UF SHORT Use once daily. Dx code E66.01  
  
 labetalol 200 mg tablet Commonly known as:  Gina Emirati Take 1 Tab by mouth three (3) times daily. Lancets Misc Test up to 2 times daily. Dx code E11.65  
  
 levothyroxine 50 mcg tablet Commonly known as:  SYNTHROID Take 1 tablet daily, except on Sundays. Stop 25 mcg dose * liraglutide 3 mg/0.5 mL (18 mg/3 mL) pen Commonly known as:  SAXENDA  
0.5 mL by SubCUTAneous route daily. sample * liraglutide 3 mg/0.5 mL (18 mg/3 mL) pen Commonly known as:  SAXENDA  
0.5 mL by SubCUTAneous route daily. sample LO LOESTRIN FE 1 mg-10 mcg (24)/10 mcg (2) Tab Generic drug:  norethindrone-e.estradiol-iron Take 1 Tab by mouth daily. metFORMIN 500 mg tablet Commonly known as:  GLUCOPHAGE Take 2 Tabs by mouth two (2) times daily (with meals). naproxen 500 mg tablet Commonly known as:  NAPROSYN  
TAKE 1 TABLET BY MOUTH TWICE A DAY AS NEEDED  
  
 phentermine-topiramate 7.5-46 mg Cm24 Commonly known as:  QSYMIA Take 1 Tab by mouth daily. Max Daily Amount: 1 Tab. PRENATAL 1+1 PO Take  by mouth daily. sertraline 50 mg tablet Commonly known as:  ZOLOFT Take 1 Tab by mouth daily. traMADol 50 mg tablet Commonly known as:  ULTRAM  
TAKE 1 TABLET BY MOUTH EVERY 4 HOURS AS NEEDED FOR PAIN  
  
 valsartan-hydroCHLOROthiazide 320-25 mg per tablet Commonly known as:  DIOVAN-HCT Take 1 Tab by mouth daily. * Notice: This list has 4 medication(s) that are the same as other medications prescribed for you. Read the directions carefully, and ask your doctor or other care provider to review them with you. Prescriptions Printed Refills  
 phentermine-topiramate (QSYMIA) 7.5-46 mg CM24 6 Sig: Take 1 Tab by mouth daily. Max Daily Amount: 1 Tab. Class: Print Route: Oral  
  
Follow-up Instructions Return in about 4 months (around 1/18/2018). To-Do List   
 09/18/2017 Lab:  HEMOGLOBIN A1C WITH EAG   
  
 09/18/2017 Lab:  LIPID PANEL   
  
 09/18/2017 Lab:  METABOLIC PANEL, COMPREHENSIVE   
  
 09/18/2017 Lab:  TSH 3RD GENERATION Patient Instructions STOP      SAXENDA  3 mg a day Chao Beck 7.5 - 46   Mg    For first month    
11.25 - 93 mg    Second  month on. .. Synthroid  50 mcg daily except sundays Metformin 500 mg 2 pills twice a day with meals =--------------------------------------------------------------------- Low carbs diet 30 gm per  Meal  
 
 
 
--------------------------------------------------------------------------------------------------------- Do not skip meals Do not eat in between meals Reduce carbs- pasta, rice, potatoes, bread Do not drink juices or sodas  , CHEESE Stop cheese, fried foods and flour products Do not eat sugar free cookies and cakes Do not eat peanut butter [facility]! Dear Jade Hamilton: Thank you for requesting a [employer] account. Our records indicate that you already have an active [employer] account. You can access your account anytime at [email or web address] Did you know that you can access your hospital and ER discharge instructions at any time in [employer]? You can also review all of your test results from your hospital stay or ER visit. Additional Information If you have questions, please visit the Frequently Asked Questions section of the [email or web address] website at [email or web address] Remember, [email or web address] is NOT to be used for urgent needs. For medical emergencies, dial 911. Now available from your iPhone and Android! Please provide this summary of care documentation to your next provider. Your primary care clinician is listed as Lissa Forbes. If you have any questions after today's visit, please call [phone].

## 2017-09-18 NOTE — PROGRESS NOTES
HISTORY OF PRESENT ILLNESS   Juan Loera is a 37 y.o. female. HPI   F/u for glucose intolerance, obesity  and Pcos after May  2017   Finally got  saxenda approved , but she stopped     Weight gain of    8    lbs     Periods are regular   Not eager to get pregnant at all           Review of Systems   Constitutional: Negative. HENT: Negative. Eyes: Negative for pain and redness. Respiratory: Negative. Cardiovascular: Negative for chest pain, palpitations and leg swelling. Gastrointestinal: Negative. Negative for constipation. Genitourinary: Negative. Musculoskeletal: Negative for myalgias. Skin: Negative. Neurological: Negative. Endo/Heme/Allergies: Negative. Psychiatric/Behavioral: Negative for depression and memory loss. The patient does not have insomnia. Physical Exam   Constitutional: She is oriented to person, place, and time. She appears well-developed and well-nourished. HENT:   Head: Normocephalic. Eyes: Conjunctivae and extraocular motions are normal. Pupils are equal, round, and reactive to light. Neck: Normal range of motion. Neck supple. No JVD present. No tracheal deviation present. No thyromegaly present. Cardiovascular: Normal rate, regular rhythm and normal heart sounds. No murmur heard. Pulmonary/Chest: Breath sounds normal.   Abdominal: Soft. Bowel sounds are normal.   Musculoskeletal: Normal range of motion. Lymphadenopathy:   She has no cervical adenopathy. Neurological: She is alert and oriented to person, place, and time. She has normal reflexes. Skin: Skin is warm. Psychiatric: She has a normal mood and affect.          Lab Results   Component Value Date/Time    Hemoglobin A1c 5.9 05/12/2017 09:50 AM    Hemoglobin A1c 5.8 01/27/2017 12:07 PM    Hemoglobin A1c 5.8 10/27/2015 08:14 AM    Hemoglobin A1c, External 5.8 07/29/2016    Hemoglobin A1c, External 5.7 01/08/2016    Glucose 101 05/12/2017 09:50 AM    Glucose (POC) 135 03/17/2011 01:28 PM Glucose  07/30/2015 04:33 PM    Microalb/Creat ratio (ug/mg creat.) 5.7 05/12/2017 09:50 AM    LDL, calculated 98 05/12/2017 09:50 AM    Creatinine 0.70 05/12/2017 09:50 AM      Lab Results   Component Value Date/Time    Cholesterol, total 183 05/12/2017 09:50 AM    HDL Cholesterol 58 05/12/2017 09:50 AM    LDL, calculated 98 05/12/2017 09:50 AM    Triglyceride 136 05/12/2017 09:50 AM       Lab Results   Component Value Date/Time    ALT (SGPT) 13 05/12/2017 09:50 AM    AST (SGOT) 12 05/12/2017 09:50 AM    Alk. phosphatase 54 05/12/2017 09:50 AM    Bilirubin, total 0.2 05/12/2017 09:50 AM             ASSESSMENT and PLAN      1. PCOS : A1C   Is    ?? Await labs          Compared to     5.9 %     From   May 2017      5.8 %    From July 2016 labs ;    5.8 %    From    Oct 2015  Compared to   5.2 %    From    July 2015 compared to  5.9 %  Again from  March 2015 compared to   From nov 2014 compared to   5.9 %  From July 2014 compared to  6 % from jan 2014 compared to 5.4 % from July 2013 compared to  6 % from jan 2013 ;  5.5 % from aug 2012 compared to last 5.4 % in jan 2012. Continue on  metformin 500 mg ( 2 pills- 1 gm) bid. She is not hopeful of getting  pregnant again. ( got  )  Dietary compliance she has to maintain      . 2. HTN : better controlled, on labetalol 200 mg tid, and diovan hctz 320/25  mg qd, add norvasc 5 mg a day   Infertility, secondary- s/p tubal adhesionolysis          3. Sub-clinical hypothyroidism :  Changing to  Synthroid 50 mcg 6 days a week       4. Obesity S/p LAP BANDING 2006  : belviq did nto help her , made it worse   Body mass index is 47.93 kg/(m^2). Manjit Yan got covered   She is not using it as she had some bruises   Asked her to stop fish oil supplements    She will try q-SYMIA      5. Vit d def - otc supplements       6.  Irregular cycles - since off mirena -  Did not fit her right away   She will be starting  LO LOestrin       > 50 % of time is spent on counseling     F/u in 6 months

## 2017-09-19 LAB
ALBUMIN SERPL-MCNC: 4.2 G/DL (ref 3.5–5.5)
ALBUMIN/GLOB SERPL: 1.3 {RATIO} (ref 1.2–2.2)
ALP SERPL-CCNC: 55 IU/L (ref 39–117)
ALT SERPL-CCNC: 11 IU/L (ref 0–32)
AST SERPL-CCNC: 14 IU/L (ref 0–40)
BILIRUB SERPL-MCNC: <0.2 MG/DL (ref 0–1.2)
BUN SERPL-MCNC: 9 MG/DL (ref 6–24)
BUN/CREAT SERPL: 11 (ref 9–23)
CALCIUM SERPL-MCNC: 9.2 MG/DL (ref 8.7–10.2)
CHLORIDE SERPL-SCNC: 100 MMOL/L (ref 96–106)
CHOLEST SERPL-MCNC: 199 MG/DL (ref 100–199)
CO2 SERPL-SCNC: 25 MMOL/L (ref 18–29)
CREAT SERPL-MCNC: 0.79 MG/DL (ref 0.57–1)
EST. AVERAGE GLUCOSE BLD GHB EST-MCNC: 117 MG/DL
GLOBULIN SER CALC-MCNC: 3.3 G/DL (ref 1.5–4.5)
GLUCOSE SERPL-MCNC: 82 MG/DL (ref 65–99)
HBA1C MFR BLD: 5.7 % (ref 4.8–5.6)
HDLC SERPL-MCNC: 62 MG/DL
INTERPRETATION, 910389: NORMAL
LDLC SERPL CALC-MCNC: 107 MG/DL (ref 0–99)
Lab: NORMAL
POTASSIUM SERPL-SCNC: 4.2 MMOL/L (ref 3.5–5.2)
PROT SERPL-MCNC: 7.5 G/DL (ref 6–8.5)
SODIUM SERPL-SCNC: 142 MMOL/L (ref 134–144)
TRIGL SERPL-MCNC: 152 MG/DL (ref 0–149)
TSH SERPL DL<=0.005 MIU/L-ACNC: 4.31 UIU/ML (ref 0.45–4.5)
VLDLC SERPL CALC-MCNC: 30 MG/DL (ref 5–40)

## 2017-10-18 RX ORDER — LEVOTHYROXINE SODIUM 25 UG/1
TABLET ORAL
Qty: 102 TAB | Refills: 1 | Status: SHIPPED | OUTPATIENT
Start: 2017-10-18 | End: 2017-10-19 | Stop reason: DRUGHIGH

## 2017-10-19 ENCOUNTER — TELEPHONE (OUTPATIENT)
Dept: ENDOCRINOLOGY | Age: 42
End: 2017-10-19

## 2017-10-19 NOTE — TELEPHONE ENCOUNTER
----- Message from [address] [place] [address]. Jose sent at 10/18/2017 10:39 AM EDT -----  Regarding: Prescription Question  Contact: [phone]  Please remove the 25mg levothyroxine I now take 50 mg.   Thank you

## 2017-11-01 ENCOUNTER — TELEPHONE (OUTPATIENT)
Dept: ENDOCRINOLOGY | Age: 42
End: 2017-11-01

## 2017-11-09 ENCOUNTER — TELEPHONE (OUTPATIENT)
Dept: ENDOCRINOLOGY | Age: 42
End: 2017-11-09

## 2017-11-09 NOTE — TELEPHONE ENCOUNTER
Spoke with patient and informed her Qsymia was denied by insurance. Will consult with  to see what to do at this point. Patient states she tried contrave and saxenda and neither worked for her. Explained to patient Jyoti Yangouard is off for a few days. Will follow up with patient after consulting with . Patient verbalized understanding.

## 2017-11-10 NOTE — TELEPHONE ENCOUNTER
Blanka Tong,     We received a letter from her insurance about what is covered ? Did you review that one ?

## 2018-01-30 ENCOUNTER — OFFICE VISIT (OUTPATIENT)
Dept: FAMILY MEDICINE CLINIC | Age: 43
End: 2018-01-30

## 2018-01-30 VITALS
TEMPERATURE: 98.1 F | SYSTOLIC BLOOD PRESSURE: 126 MMHG | HEIGHT: 65 IN | BODY MASS INDEX: 48.82 KG/M2 | HEART RATE: 73 BPM | WEIGHT: 293 LBS | DIASTOLIC BLOOD PRESSURE: 85 MMHG | RESPIRATION RATE: 19 BRPM | OXYGEN SATURATION: 98 %

## 2018-01-30 DIAGNOSIS — M25.50 MULTIPLE JOINT PAIN: Primary | ICD-10-CM

## 2018-01-30 RX ORDER — CITALOPRAM 20 MG/1
20 TABLET, FILM COATED ORAL DAILY
COMMUNITY
Start: 2017-11-22 | End: 2019-05-08 | Stop reason: ALTCHOICE

## 2018-01-30 RX ORDER — DROSPIRENONE AND ETHINYL ESTRADIOL 0.02-3(28)
KIT ORAL
COMMUNITY
Start: 2017-11-16 | End: 2019-05-08 | Stop reason: ALTCHOICE

## 2018-01-30 NOTE — PROGRESS NOTES
1. Have you been to the ER, urgent care clinic since your last visit? Hospitalized since your last visit? [facility] on 1/29/18 chest pain    2. Have you seen or consulted any other health care providers outside of the [address] [place] [address] since your last visit? Include any pap smears or colon screening.  [facility] on 1/29/18 chest pain      Chief Complaint   Patient presents with   [facility].

## 2018-01-30 NOTE — MR AVS SNAPSHOT
[address] [address] 
[phone] Patient: Skinny Mcgee MRN: [id number] :1975 Visit Information Date & Time Provider Department Dept. Phone Encounter #  
 2018 10:30 AM Ronen Nuno MD [address] [facility] [id number] Your Appointments 2018  3:45 PM  
ROUTINE CARE with Carroll Wiseman MD  
[facility]) Appt Note: f/u 4 month; r/s from 18 msb 18  
 [address] [facility] [address]  
[phone]  
  
   
 Venkat L[address] [address] Upcoming Health Maintenance Date Due DTaP/Tdap/Td series (1 - Tdap) 10/22/1997 PAP AKA CERVICAL CYTOLOGY 10/22/1997 Influenza Age 5 to Adult 2017 Allergies as of 2018  Review Complete On: 2018 By: Ronen Nuno MD  
  
 Severity Noted Reaction Type Reactions Pcn [Penicillins] High 10/27/2010    Swelling Hydralazine  2017    Other (comments) Lupus reaction Reglan [Metoclopramide]  2011    Swelling Of tongue Current Immunizations  Never Reviewed No immunizations on file. Not reviewed this visit You Were Diagnosed With   
  
 Codes Comments Multiple joint pain    -  Primary ICD-10-CM: M25.50 ICD-9-CM: 719.49 Vitals BP Pulse Temp Resp Height(growth percentile) Weight(growth percentile) 126/85 73 98.1 °F (36.7 °C) (Oral) 19 5' 5\" (1.651 m) 298 lb (135.2 kg) LMP SpO2 BMI OB Status Smoking Status 2017 98% 49.59 kg/m2 Unknown Never Smoker Vitals History BMI and BSA Data Body Mass Index Body Surface Area  
 49.59 kg/m 2 2.49 m 2 Preferred Pharmacy Pharmacy Name Phone [id number] [address] [phone] Your Updated Medication List  
  
   
 This list is accurate as of: 1/30/18 11:52 AM.  Always use your most recent med list.  
  
  
  
  
 acyclovir 400 mg tablet Commonly known as:  ZOVIRAX Take 400 mg by mouth daily. amLODIPine 5 mg tablet Commonly known as:  Bosie Shield Take 1 Tab by mouth daily. citalopram 20 mg tablet Commonly known as:  Lemmie Foster Take 20 mg by mouth daily. cyclobenzaprine 10 mg tablet Commonly known as:  FLEXERIL Take 1 Tab by mouth as needed. glucose blood VI test strips strip Commonly known as:  Michaell Mince Test up to 2 times daily. Dx code E11.65 * BD INSULIN PEN NEEDLE UF ORIG 29 gauge x 1/2\" Ndle Generic drug:  Insulin Needles (Disposable) * Insulin Needles (Disposable) 31 gauge x 5/16\" Ndle Commonly known as:  BD INSULIN PEN NEEDLE UF SHORT Use once daily. Dx code E66.01  
  
 labetalol 200 mg tablet Commonly known as:  Mayra Ghazi Take 1 Tab by mouth three (3) times daily. Lancets Misc Test up to 2 times daily. Dx code E11.65  
  
 levothyroxine 50 mcg tablet Commonly known as:  SYNTHROID Take 1 tablet daily, except on Sundays. Stop 25 mcg dose * liraglutide 3 mg/0.5 mL (18 mg/3 mL) pen Commonly known as:  SAXENDA  
0.5 mL by SubCUTAneous route daily. sample * liraglutide 3 mg/0.5 mL (18 mg/3 mL) pen Commonly known as:  SAXENDA  
0.5 mL by SubCUTAneous route daily. sample LO LOESTRIN FE 1 mg-10 mcg (24)/10 mcg (2) Tab Generic drug:  norethindrone-e.estradiol-iron Take 1 Tab by mouth daily. LORYNA (28) 3-0.02 mg Tab Generic drug:  drospirenone-ethinyl estradiol TAKE 1 TABLET BY MOUTH ONCE DAILY FOR 84 DAYS  
  
 metFORMIN 500 mg tablet Commonly known as:  GLUCOPHAGE Take 2 Tabs by mouth two (2) times daily (with meals). naproxen 500 mg tablet Commonly known as:  NAPROSYN  
TAKE 1 TABLET BY MOUTH TWICE A DAY AS NEEDED  
  
 phentermine-topiramate 7.5-46 mg Cm24 Commonly known as:  QSYMIA Take 1 Tab by mouth daily. Max Daily Amount: 1 Tab. PRENATAL 1+1 PO Take  by mouth daily. sertraline 50 mg tablet Commonly known as:  ZOLOFT Take 1 Tab by mouth daily. traMADol 50 mg tablet Commonly known as:  ULTRAM  
TAKE 1 TABLET BY MOUTH EVERY 4 HOURS AS NEEDED FOR PAIN  
  
 valsartan-hydroCHLOROthiazide 320-25 mg per tablet Commonly known as:  DIOVAN-HCT Take 1 Tab by mouth daily. * Notice: This list has 4 medication(s) that are the same as other medications prescribed for you. Read the directions carefully, and ask your doctor or other care provider to review them with you. Introducing [facility]! Dear Raoul Lou: Thank you for requesting a [employer] account. Our records indicate that you already have an active [employer] account. You can access your account anytime at [email or web address] Did you know that you can access your hospital and ER discharge instructions at any time in [employer]? You can also review all of your test results from your hospital stay or ER visit. Additional Information If you have questions, please visit the Frequently Asked Questions section of the [employer] website at [email or web address] Remember, [employer] is NOT to be used for urgent needs. For medical emergencies, dial 911. Now available from your iPhone and Android! Please provide this summary of care documentation to your next provider. Your primary care clinician is listed as Mo Andres. If you have any questions after today's visit, please call [phone].

## 2018-01-30 NOTE — PROGRESS NOTES
Chief Complaint   Patient presents with   [facility]     she is a 40y.o. year old female who presents for evalution. She has HTN and type 2 diabetes. Friday she had a DNC, she had no pain before the procedure and after she c/o pain under her ribs, her thighs bilaterally and . Her back also hurts  [facility] the [facility] and was tested for flu and pneumonia. -both were negative  She is taking flexeril and some other med which is not relieving the pain   there is no h/o trauma. Reviewed PmHx, RxHx, FmHx, SocHx, AllgHx and updated and dated in the chart.     Aspirin yes ____   No____ N/A____    Patient Active Problem List    Diagnosis    Prediabetes    Hypertension    Morbid obesity ([address]     S/p lap band      PCOS (polycystic ovarian syndrome)    DM (diabetes mellitus) in pregnancy       Nurse notes were reviewed and copied and are correct  Review of Systems - negative except as listed above in the HPI    Objective:     Vitals:    01/30/18 1055   BP: 126/85   Pulse: 73   Resp: 19   Temp: 98.1 °F (36.7 °C)   TempSrc: Oral   SpO2: 98%   Weight: 298 lb (135.2 kg)   Height: 5' 5\" (1.651 m)       Physical Examination: General appearance - alert, well appearing, and in no distress and oriented to person, place, and time  Mental status - alert, oriented to person, place, and time, normal mood, behavior, speech, dress, motor activity, and thought processes  Mouth - mucous membranes moist, pharynx normal without lesions  Neck - supple, no significant adenopathy  Lymphatics - no palpable lymphadenopathy, no hepatosplenomegaly  Chest - clear to auscultation, no wheezes, rales or rhonchi, symmetric air entry  Heart - normal rate, regular rhythm, normal S1, S2, no murmurs, rubs, clicks or gallops  Abdomen - soft, nontender, nondistended, no masses or organomegaly  Musculoskeletal - no joint tenderness, deformity or swelling  Extremities - peripheral pulses normal, no pedal edema, no clubbing or cyanosis  Skin - normal coloration and turgor, no rashes, no suspicious skin lesions noted      Assessment/ Plan:   Diagnoses and all orders for this visit:    1. Multiple joint pain    I think the operating room had to position her in a way that caused muscle spasm postop. She is obese and the D and C procedure required special positioning of the legs in order to have access to the pelvis  She was given today stretches and exercises to do to relax muscles. Use the meds already given prn. If not better in two weeks I will refer to PT     Follow-up Disposition: Not on File    ICD-10-CM ICD-9-CM    1. Multiple joint pain M25.50 719.49        I have discussed the diagnosis with the patient and the intended plan as seen in the above orders. The patient has received an after-visit summary and questions were answered concerning future plans. Medication Side Effects and Warnings were discussed with patient: yes  Patient Labs were reviewed and or requested: yes  Patient Past Records were reviewed and or requested: yes        There are no Patient Instructions on file for this visit.     The patient verbalizes understanding and agrees with the plan of care        Patient has the advanced directives booklet to review

## 2018-04-03 ENCOUNTER — OFFICE VISIT (OUTPATIENT)
Dept: ENDOCRINOLOGY | Age: 43
End: 2018-04-03

## 2018-04-03 VITALS
HEIGHT: 65 IN | DIASTOLIC BLOOD PRESSURE: 80 MMHG | TEMPERATURE: 95.2 F | WEIGHT: 282.1 LBS | RESPIRATION RATE: 14 BRPM | OXYGEN SATURATION: 98 % | SYSTOLIC BLOOD PRESSURE: 117 MMHG | HEART RATE: 78 BPM | BODY MASS INDEX: 47 KG/M2

## 2018-04-03 DIAGNOSIS — E28.2 PCOS (POLYCYSTIC OVARIAN SYNDROME): ICD-10-CM

## 2018-04-03 DIAGNOSIS — I10 ESSENTIAL HYPERTENSION: ICD-10-CM

## 2018-04-03 DIAGNOSIS — E74.39 GLUCOSE INTOLERANCE: Primary | ICD-10-CM

## 2018-04-03 DIAGNOSIS — R73.03 PREDIABETES: ICD-10-CM

## 2018-04-03 DIAGNOSIS — E66.01 CLASS 3 SEVERE OBESITY DUE TO EXCESS CALORIES WITH SERIOUS COMORBIDITY AND BODY MASS INDEX (BMI) OF 45.0 TO 49.9 IN ADULT (HCC): ICD-10-CM

## 2018-04-03 LAB
GLUCOSE POC: 116 MG/DL
HBA1C MFR BLD HPLC: 6.1 %

## 2018-04-03 RX ORDER — LANCETS
EACH MISCELLANEOUS
Qty: 200 PACKAGE | Refills: 4 | Status: SHIPPED | OUTPATIENT
Start: 2018-04-03 | End: 2020-04-16 | Stop reason: SDUPTHER

## 2018-04-03 RX ORDER — LEVOTHYROXINE SODIUM 50 UG/1
TABLET ORAL
Qty: 90 TAB | Refills: 4 | Status: SHIPPED | OUTPATIENT
Start: 2018-04-03 | End: 2018-04-05 | Stop reason: ALTCHOICE

## 2018-04-03 RX ORDER — METFORMIN HYDROCHLORIDE 500 MG/1
1000 TABLET ORAL 2 TIMES DAILY WITH MEALS
Qty: 360 TAB | Refills: 4 | Status: SHIPPED | OUTPATIENT
Start: 2018-04-03 | End: 2019-07-01 | Stop reason: SDUPTHER

## 2018-04-03 NOTE — MR AVS SNAPSHOT
[address] [facility] [address] 
[phone] Patient: Yaakov Kanner MRN: [id number] :1975 Visit Information Date & Time Provider Department Dept. Phone Encounter #  
 4/3/2018  3:45 PM Claudene Maffucci, MD [facility] [phone] [id number] Follow-up Instructions Return in about 4 months (around 8/3/2018). Upcoming Health Maintenance Date Due DTaP/Tdap/Td series (1 - Tdap) 10/22/1997 PAP AKA CERVICAL CYTOLOGY 10/22/1997 Influenza Age 5 to Adult 2017 Allergies as of 4/3/2018  Review Complete On: 4/3/2018 By: Claudene Maffucci, MD  
  
 Severity Noted Reaction Type Reactions Pcn [Penicillins] High 10/27/2010    Swelling Hydralazine  2017    Other (comments) Lupus reaction Reglan [Metoclopramide]  2011    Swelling Of tongue Current Immunizations  Never Reviewed No immunizations on file. Not reviewed this visit You Were Diagnosed With   
  
 Codes Comments Type 2 diabetes mellitus with hyperglycemia, without long-term current use of insulin (HCC)    -  Primary ICD-10-CM: E11.65 ICD-9-CM: 250.00, 790.29 Vitals BP Pulse Temp Resp Height(growth percentile) Weight(growth percentile) 117/80 (BP 1 Location: Left arm, BP Patient Position: Sitting) 78 95.2 °F (35.1 °C) (Oral) 14 5' 5\" (1.651 m) 282 lb 1.6 oz (128 kg) SpO2 BMI OB Status Smoking Status 98% 46.94 kg/m2 Unknown Never Smoker Vitals History BMI and BSA Data Body Mass Index Body Surface Area 46.94 kg/m 2 2.42 m 2 Preferred Pharmacy Pharmacy Name Phone [id number]  [address] [place] [address] [phone] Your Updated Medication List  
  
   
This list is accurate as of 4/3/18  4:31 PM.  Always use your most recent med list.  
  
  
  
  
 acyclovir 400 mg tablet Commonly known as:  ZOVIRAX Take 400 mg by mouth daily. amLODIPine 5 mg tablet Commonly known as:  Jaleesa Ameena Take 1 Tab by mouth daily. citalopram 20 mg tablet Commonly known as:  Devine Josee Take 20 mg by mouth daily. cyclobenzaprine 10 mg tablet Commonly known as:  FLEXERIL Take 1 Tab by mouth as needed. glucose blood VI test strips strip Commonly known as:  Char Ng Test up to 2 times daily. Dx code E11.65 * BD INSULIN PEN NEEDLE UF ORIG 29 gauge x 1/2\" Ndle Generic drug:  Insulin Needles (Disposable) * Insulin Needles (Disposable) 31 gauge x 5/16\" Ndle Commonly known as:  BD INSULIN PEN NEEDLE UF SHORT Use once daily. Dx code E66.01  
  
 labetalol 200 mg tablet Commonly known as:  Winston Geno Take 1 Tab by mouth three (3) times daily. Lancets Misc Test up to 2 times daily. Dx code E11.65  
  
 levothyroxine 50 mcg tablet Commonly known as:  SYNTHROID Take 1 tablet daily, except on Sundays. Stop 25 mcg dose  
  
 liraglutide 3 mg/0.5 mL (18 mg/3 mL) pen Commonly known as:  SAXENDA  
0.5 mL by SubCUTAneous route daily. sample LORYNA (28) 3-0.02 mg Tab Generic drug:  drospirenone-ethinyl estradiol TAKE 1 TABLET BY MOUTH ONCE DAILY FOR 84 DAYS  
  
 metFORMIN 500 mg tablet Commonly known as:  GLUCOPHAGE Take 2 Tabs by mouth two (2) times daily (with meals). naproxen 500 mg tablet Commonly known as:  NAPROSYN  
TAKE 1 TABLET BY MOUTH TWICE A DAY AS NEEDED PRENATAL 1+1 PO Take  by mouth daily. sertraline 50 mg tablet Commonly known as:  ZOLOFT Take 1 Tab by mouth daily. traMADol 50 mg tablet Commonly known as:  ULTRAM  
TAKE 1 TABLET BY MOUTH EVERY 4 HOURS AS NEEDED FOR PAIN  
  
 valsartan-hydroCHLOROthiazide 320-25 mg per tablet Commonly known as:  DIOVAN-HCT Take 1 Tab by mouth daily. * Notice:   This list has 2 medication(s) that are the same as other medications prescribed for you. Read the directions carefully, and ask your doctor or other care provider to review them with you. We Performed the Following AMB POC GLUCOSE, QUANTITATIVE, BLOOD [55039 CPT(R)] AMB POC HEMOGLOBIN A1C [34950 CPT(R)] HEMOGLOBIN A1C WITH EAG [22328 CPT(R)] LIPID PANEL [80963 CPT(R)] METABOLIC PANEL, COMPREHENSIVE [05956 CPT(R)] MICROALBUMIN, UR, RAND W/ MICROALB/CREAT RATIO [id number] CPT(R)] TSH 3RD GENERATION [57141 CPT(R)] Follow-up Instructions Return in about 4 months (around 8/3/2018). Patient Instructions STOP    SAXENDA  3 mg a day STOP  Q-symia Synthroid  50 mcg daily except sundays Metformin 500 mg 2 pills twice a day with meals =--------------------------------------------------------------------- Low carbs diet 30 gm per  Meal  
 
 
 
--------------------------------------------------------------------------------------------------------- Do not skip meals Do not eat in between meals Reduce carbs- pasta, rice, potatoes, bread Do not drink juices or sodas  , CHEESE Stop cheese, fried foods and flour products Do not eat sugar free cookies and cakes Do not eat peanut butter [facility]! Dear Aubree Lentz: Thank you for requesting a [employer] account. Our records indicate that you already have an active [employer] account. You can access your account anytime at [email or web address] Did you know that you can access your hospital and ER discharge instructions at any time in MyChart? You can also review all of your test results from your hospital stay or ER visit. Additional Information If you have questions, please visit the Frequently Asked Questions section of the [employer] website at [email or web address] care [email or web address] Remember, [employer] is NOT to be used for urgent needs. For medical emergencies, dial 911. Now available from your iPhone and Android! Please provide this summary of care documentation to your next provider. If you have any questions after today's visit, please call [phone].

## 2018-04-03 NOTE — PATIENT INSTRUCTIONS
STOP    SAXENDA  3 mg a day     STOP  Q-symia       Synthroid  50 mcg daily except sundays   Metformin 500 mg 2 pills twice a day with meals     =---------------------------------------------------------------------    Low carbs diet   30 gm per  Meal         ---------------------------------------------------------------------------------------------------------        Do not skip meals  Do not eat in between meals    Reduce carbs- pasta, rice, potatoes, bread   Do not drink juices or sodas  , CHEESE   Stop cheese, fried foods and flour products    Do not eat sugar free cookies and cakes   Do not eat peanut butter

## 2018-04-03 NOTE — PROGRESS NOTES
HISTORY OF PRESENT ILLNESS   Eloise Sterling is a 40 y.o. female. HPI   F/u for glucose intolerance, obesity  and Pcos after jan 2017       Weight Loss  of 16     lbs     She took decongestant y-day and she feels unwell today     She used probiotics     She did nto try q symia            Review of Systems   Constitutional: Negative. HENT: Negative. Eyes: Negative for pain and redness. Respiratory: Negative. Cardiovascular: Negative for chest pain, palpitations and leg swelling. Gastrointestinal: Negative. Negative for constipation. Genitourinary: Negative. Musculoskeletal: Negative for myalgias. Skin: Negative. Neurological: Negative. Endo/Heme/Allergies: Negative. Psychiatric/Behavioral: Negative for depression and memory loss. The patient does not have insomnia. Physical Exam   Constitutional: She is oriented to person, place, and time. She appears well-developed and well-nourished. HENT:   Head: Normocephalic. Eyes: Conjunctivae and extraocular motions are normal. Pupils are equal, round, and reactive to light. Neck: Normal range of motion. Neck supple. No JVD present. No tracheal deviation present. No thyromegaly present. Cardiovascular: Normal rate, regular rhythm and normal heart sounds. No murmur heard. Pulmonary/Chest: Breath sounds normal.   Abdominal: Soft. Bowel sounds are normal.   Musculoskeletal: Normal range of motion. Lymphadenopathy:   She has no cervical adenopathy. Neurological: She is alert and oriented to person, place, and time. She has normal reflexes. Skin: Skin is warm. Psychiatric: She has a normal mood and affect.          Lab Results   Component Value Date/Time    Hemoglobin A1c 5.7 (H) 09/18/2017 05:23 PM    Hemoglobin A1c 5.9 (H) 05/12/2017 09:50 AM    Hemoglobin A1c 5.8 (H) 01/27/2017 12:07 PM    Hemoglobin A1c, External 5.8 07/29/2016    Hemoglobin A1c, External 5.7 01/08/2016    Glucose 82 09/18/2017 05:23 PM    Glucose (POC) 135 (H) 03/17/2011 01:28 PM    Glucose  07/30/2015 04:33 PM    Microalb/Creat ratio (ug/mg creat.) 5.7 05/12/2017 09:50 AM    LDL, calculated 107 (H) 09/18/2017 05:23 PM    Creatinine 0.79 09/18/2017 05:23 PM      Lab Results   Component Value Date/Time    Cholesterol, total 199 09/18/2017 05:23 PM    HDL Cholesterol 62 09/18/2017 05:23 PM    LDL, calculated 107 (H) 09/18/2017 05:23 PM    Triglyceride 152 (H) 09/18/2017 05:23 PM       Lab Results   Component Value Date/Time    ALT (SGPT) 11 09/18/2017 05:23 PM    AST (SGOT) 14 09/18/2017 05:23 PM    Alk. phosphatase 55 09/18/2017 05:23 PM    Bilirubin, total <0.2 09/18/2017 05:23 PM             ASSESSMENT and PLAN      1. PCOS : A1C   Is    6.1 %     From    toda y, April 2018 ,         Compared to     5.9 %     From   May 2017      5.8 %    From July 2016 labs ;    5.8 %    From    Oct 2015  Compared to   5.2 %    From    July 2015 compared to  5.9 %  Again from  March 2015 compared to   From nov 2014 compared to   5.9 %  From July 2014 compared to  6 % from jan 2014 compared to 5.4 % from July 2013 compared to  6 % from jan 2013 ;  5.5 % from aug 2012 compared to last 5.4 % in jan 2012. Continue on  metformin 500 mg ( 2 pills- 1 gm) bid. She is not hopeful of getting  pregnant again. ( got  )  Dietary compliance she has to maintain      . 2. HTN : better controlled, on labetalol 200 mg tid, and diovan hctz 320/25  mg qd, add norvasc 5 mg a day   Infertility, secondary- s/p tubal adhesionolysis          3. Sub-clinical hypothyroidism :  Changing to  Synthroid 50 mcg 6 days a week       4. Obesity S/p LAP BANDING 2006  : belviq did nto help her , made it worse   Body mass index is 46.94 kg/(m^2). Brynn got covered , but  not using it as she had some bruises   Asked her to stop fish oil supplements    She never tried  q-SYMIA, requested refill       5. Vit d def - otc supplements       6.  Irregular cycles - since off mirena -  Did not fit her right away   She will be starting  LO LOestrin   She had D and C   She feels worse since then       > 50 % of time is spent on counseling   Patient voiced understanding her plan of care       Labs today     F/u in 6 months

## 2018-04-03 NOTE — PROGRESS NOTES
Hamilton Rodriguez is a 40 y.o. female here for   Chief Complaint   Patient presents with    Diabetes    Thyroid Problem    PCOS       Functional glucose monitor and record keeping system? - yes  Eye exam within last year? - Feb 2018 , [facility]  Foot exam within last year? - due    1. Have you been to the ER, urgent care clinic since your last visit? Hospitalized since your last visit? - [facility] Jan 2018 for D&C    2. Have you seen or consulted any other health care providers outside of the [address] [place] [address] since your last visit?   Include any pap smears or colon screening.-Dr. Yadiel Topete      Lab Results   Component Value Date/Time    Hemoglobin A1c 5.7 (H) 09/18/2017 05:23 PM    Hemoglobin A1c (POC) 5.2 07/30/2015 04:41 PM    Hemoglobin A1c, External 5.8 07/29/2016       Wt Readings from Last 3 Encounters:   04/03/18 282 lb 1.6 oz (128 kg)   01/30/18 298 lb (135.2 kg)   09/18/17 288 lb (130.6 kg)     Temp Readings from Last 3 Encounters:   04/03/18 95.2 °F (35.1 °C) (Oral)   01/30/18 98.1 °F (36.7 °C) (Oral)   09/18/17 98.1 °F (36.7 °C) (Oral)     BP Readings from Last 3 Encounters:   04/03/18 117/80   01/30/18 126/85   09/18/17 (!) 144/96     Pulse Readings from Last 3 Encounters:   04/03/18 78   01/30/18 73   09/18/17 71

## 2018-04-04 LAB
ALBUMIN SERPL-MCNC: 4 G/DL (ref 3.5–5.5)
ALBUMIN/CREAT UR: 14.4 MG/G CREAT (ref 0–30)
ALBUMIN/GLOB SERPL: 1.2 {RATIO} (ref 1.2–2.2)
ALP SERPL-CCNC: 59 IU/L (ref 39–117)
ALT SERPL-CCNC: 14 IU/L (ref 0–32)
AST SERPL-CCNC: 14 IU/L (ref 0–40)
BILIRUB SERPL-MCNC: 0.2 MG/DL (ref 0–1.2)
BUN SERPL-MCNC: 8 MG/DL (ref 6–24)
BUN/CREAT SERPL: 12 (ref 9–23)
CALCIUM SERPL-MCNC: 9.1 MG/DL (ref 8.7–10.2)
CHLORIDE SERPL-SCNC: 99 MMOL/L (ref 96–106)
CHOLEST SERPL-MCNC: 196 MG/DL (ref 100–199)
CO2 SERPL-SCNC: 21 MMOL/L (ref 18–29)
CREAT SERPL-MCNC: 0.66 MG/DL (ref 0.57–1)
CREAT UR-MCNC: 376.2 MG/DL
EST. AVERAGE GLUCOSE BLD GHB EST-MCNC: 123 MG/DL
GFR SERPLBLD CREATININE-BSD FMLA CKD-EPI: 110 ML/MIN/1.73
GFR SERPLBLD CREATININE-BSD FMLA CKD-EPI: 127 ML/MIN/1.73
GLOBULIN SER CALC-MCNC: 3.3 G/DL (ref 1.5–4.5)
GLUCOSE SERPL-MCNC: 117 MG/DL (ref 65–99)
HBA1C MFR BLD: 5.9 % (ref 4.8–5.6)
HDLC SERPL-MCNC: 76 MG/DL
INTERPRETATION, 910389: NORMAL
LDLC SERPL CALC-MCNC: 85 MG/DL (ref 0–99)
Lab: NORMAL
MICROALBUMIN UR-MCNC: 54.1 UG/ML
POTASSIUM SERPL-SCNC: 3.7 MMOL/L (ref 3.5–5.2)
PROT SERPL-MCNC: 7.3 G/DL (ref 6–8.5)
SODIUM SERPL-SCNC: 140 MMOL/L (ref 134–144)
TRIGL SERPL-MCNC: 173 MG/DL (ref 0–149)
TSH SERPL DL<=0.005 MIU/L-ACNC: 4.71 UIU/ML (ref 0.45–4.5)
VLDLC SERPL CALC-MCNC: 35 MG/DL (ref 5–40)

## 2018-04-05 RX ORDER — LEVOTHYROXINE SODIUM 50 UG/1
50 TABLET ORAL
Qty: 30 TAB | Refills: 6 | Status: SHIPPED | OUTPATIENT
Start: 2018-04-05 | End: 2018-05-15 | Stop reason: SDUPTHER

## 2018-04-05 NOTE — PROGRESS NOTES
Labs are fine, thyroid medication is all what she needs to take it consistently like she should   If she is already, I want to change the dose and do it BRAND unithroid

## 2018-04-05 NOTE — TELEPHONE ENCOUNTER
----- Message from Yoni Arredondo MD sent at 4/5/2018  8:34 AM EDT -----  Labs are fine, thyroid medication is all what she needs to take it consistently like she should   If she is already, I want to change the dose and do it BRAND unithroid

## 2018-04-05 NOTE — PROGRESS NOTES
Informed pt. She is taking correctly. Informed her, we will be sending brand Unithyroid.  Savings card mailed to address on file

## 2018-04-30 RX ORDER — BLOOD SUGAR DIAGNOSTIC
STRIP MISCELLANEOUS
Qty: 200 STRIP | Refills: 4 | Status: SHIPPED | OUTPATIENT
Start: 2018-04-30 | End: 2020-03-02 | Stop reason: SDUPTHER

## 2018-05-15 RX ORDER — LEVOTHYROXINE SODIUM 50 UG/1
50 TABLET ORAL
Qty: 90 TAB | Refills: 4 | Status: SHIPPED | OUTPATIENT
Start: 2018-05-15 | End: 2018-08-31 | Stop reason: SDUPTHER

## 2018-07-24 ENCOUNTER — TELEPHONE (OUTPATIENT)
Dept: ENDOCRINOLOGY | Age: 43
End: 2018-07-24

## 2018-07-24 DIAGNOSIS — E66.01 CLASS 3 SEVERE OBESITY DUE TO EXCESS CALORIES WITH SERIOUS COMORBIDITY AND BODY MASS INDEX (BMI) OF 45.0 TO 49.9 IN ADULT (HCC): ICD-10-CM

## 2018-07-24 DIAGNOSIS — R73.03 PREDIABETES: Primary | ICD-10-CM

## 2018-07-24 DIAGNOSIS — E74.39 GLUCOSE INTOLERANCE: ICD-10-CM

## 2018-07-24 DIAGNOSIS — I10 ESSENTIAL HYPERTENSION: ICD-10-CM

## 2018-07-24 DIAGNOSIS — E28.2 PCOS (POLYCYSTIC OVARIAN SYNDROME): ICD-10-CM

## 2018-07-24 NOTE — TELEPHONE ENCOUNTER
Please print a [facility] from for patient to  tomorrow 7/25/18. She is cancelling in house lab.  Thank you

## 2018-07-25 NOTE — TELEPHONE ENCOUNTER
Lab orders available for  at the     Order placed for pt,  per Verbal Order with read back from Dr Laurence Urrutia on 7/25/2018.

## 2018-07-28 LAB
ALBUMIN SERPL-MCNC: 4 G/DL (ref 3.5–5.5)
ALBUMIN/CREAT UR: 3.1 MG/G CREAT (ref 0–30)
ALBUMIN/GLOB SERPL: 1.1 {RATIO} (ref 1.2–2.2)
ALP SERPL-CCNC: 64 IU/L (ref 39–117)
ALT SERPL-CCNC: 12 IU/L (ref 0–32)
AST SERPL-CCNC: 15 IU/L (ref 0–40)
BILIRUB SERPL-MCNC: <0.2 MG/DL (ref 0–1.2)
BUN SERPL-MCNC: 10 MG/DL (ref 6–24)
BUN/CREAT SERPL: 14 (ref 9–23)
CALCIUM SERPL-MCNC: 9.6 MG/DL (ref 8.7–10.2)
CHLORIDE SERPL-SCNC: 101 MMOL/L (ref 96–106)
CHOLEST SERPL-MCNC: 189 MG/DL (ref 100–199)
CO2 SERPL-SCNC: 22 MMOL/L (ref 20–29)
CREAT SERPL-MCNC: 0.74 MG/DL (ref 0.57–1)
CREAT UR-MCNC: 183.2 MG/DL
EST. AVERAGE GLUCOSE BLD GHB EST-MCNC: 120 MG/DL
GLOBULIN SER CALC-MCNC: 3.5 G/DL (ref 1.5–4.5)
GLUCOSE SERPL-MCNC: 101 MG/DL (ref 65–99)
HBA1C MFR BLD: 5.8 % (ref 4.8–5.6)
HDLC SERPL-MCNC: 74 MG/DL
INTERPRETATION, 910389: NORMAL
LDLC SERPL CALC-MCNC: 75 MG/DL (ref 0–99)
MICROALBUMIN UR-MCNC: 5.7 UG/ML
POTASSIUM SERPL-SCNC: 4.1 MMOL/L (ref 3.5–5.2)
PROT SERPL-MCNC: 7.5 G/DL (ref 6–8.5)
SODIUM SERPL-SCNC: 140 MMOL/L (ref 134–144)
TRIGL SERPL-MCNC: 200 MG/DL (ref 0–149)
TSH SERPL DL<=0.005 MIU/L-ACNC: 5.22 UIU/ML (ref 0.45–4.5)
VLDLC SERPL CALC-MCNC: 40 MG/DL (ref 5–40)

## 2018-08-31 DIAGNOSIS — E03.4 HYPOTHYROIDISM DUE TO ACQUIRED ATROPHY OF THYROID: Primary | ICD-10-CM

## 2018-08-31 RX ORDER — LEVOTHYROXINE SODIUM 50 UG/1
50 TABLET ORAL
Qty: 105 TAB | Refills: 4 | Status: SHIPPED | OUTPATIENT
Start: 2018-08-31 | End: 2018-10-02 | Stop reason: ALTCHOICE

## 2018-09-24 RX ORDER — LOSARTAN POTASSIUM AND HYDROCHLOROTHIAZIDE 25; 100 MG/1; MG/1
TABLET ORAL
Refills: 4 | COMMUNITY
Start: 2018-07-17 | End: 2018-10-02 | Stop reason: ALTCHOICE

## 2018-10-02 ENCOUNTER — OFFICE VISIT (OUTPATIENT)
Dept: ENDOCRINOLOGY | Age: 43
End: 2018-10-02

## 2018-10-02 ENCOUNTER — DOCUMENTATION ONLY (OUTPATIENT)
Dept: ENDOCRINOLOGY | Age: 43
End: 2018-10-02

## 2018-10-02 VITALS
TEMPERATURE: 98.7 F | HEIGHT: 65 IN | DIASTOLIC BLOOD PRESSURE: 87 MMHG | BODY MASS INDEX: 48.66 KG/M2 | WEIGHT: 292.1 LBS | RESPIRATION RATE: 16 BRPM | OXYGEN SATURATION: 100 % | SYSTOLIC BLOOD PRESSURE: 137 MMHG | HEART RATE: 72 BPM

## 2018-10-02 DIAGNOSIS — I10 ESSENTIAL HYPERTENSION: ICD-10-CM

## 2018-10-02 DIAGNOSIS — E28.2 PCOS (POLYCYSTIC OVARIAN SYNDROME): Primary | ICD-10-CM

## 2018-10-02 DIAGNOSIS — E66.01 MORBID OBESITY (HCC): ICD-10-CM

## 2018-10-02 DIAGNOSIS — R73.03 PREDIABETES: ICD-10-CM

## 2018-10-02 RX ORDER — LEVOTHYROXINE SODIUM 75 UG/1
TABLET ORAL
Qty: 30 TAB | Refills: 6 | Status: SHIPPED | OUTPATIENT
Start: 2018-10-02 | End: 2018-12-05 | Stop reason: SDUPTHER

## 2018-10-02 RX ORDER — SPIRONOLACTONE 25 MG/1
25 TABLET ORAL 2 TIMES DAILY
Qty: 60 TAB | Refills: 6 | Status: SHIPPED | OUTPATIENT
Start: 2018-10-02 | End: 2019-03-04 | Stop reason: SDUPTHER

## 2018-10-02 RX ORDER — CANDESARTAN CILEXETIL AND HYDROCHLOROTHIAZIDE 32; 25 MG/1; MG/1
TABLET ORAL
Qty: 30 TAB | Refills: 6 | Status: SHIPPED | OUTPATIENT
Start: 2018-10-02 | End: 2018-10-25 | Stop reason: SDUPTHER

## 2018-10-02 RX ORDER — MINERAL OIL
180 ENEMA (ML) RECTAL
COMMUNITY

## 2018-10-02 NOTE — PROGRESS NOTES
1. Have you been to the ER, urgent care clinic since your last visit? Hospitalized since your last visit? No 
 
2. Have you seen or consulted any other health care providers outside of the [address] [place] [address] since your last visit? Include any pap smears or colon screening. No  
 
Wt Readings from Last 3 Encounters:  
10/02/18 292 lb 1.6 oz (132.5 kg) 04/03/18 282 lb 1.6 oz (128 kg) 01/30/18 298 lb (135.2 kg) Temp Readings from Last 3 Encounters:  
10/02/18 98.7 °F (37.1 °C) (Oral) 04/03/18 95.2 °F (35.1 °C) (Oral) 01/30/18 98.1 °F (36.7 °C) (Oral) BP Readings from Last 3 Encounters:  
10/02/18 137/87  
04/03/18 117/80  
01/30/18 126/85 Pulse Readings from Last 3 Encounters:  
10/02/18 72  
04/03/18 78  
01/30/18 73 Lab Results Component Value Date/Time Hemoglobin A1c 5.8 (H) 07/27/2018 08:13 AM  
 Hemoglobin A1c (POC) 6.1 04/03/2018 04:22 PM  
 Hemoglobin A1c, External 5.8 07/29/2016  
 pt has not meters today.

## 2018-10-02 NOTE — MR AVS SNAPSHOT
[facility] [address] 
[phone] Patient: Chaparrita Moseley MRN: [id number] :1975 Visit Information Date & Time Provider Department Dept. Phone Encounter #  
 10/2/2018  1:15 PM Vidal Velazquez MD [facility] Diabetes & Endocrinology [phone] [id number] Follow-up Instructions Return in about 6 months (around 2019). Upcoming Health Maintenance Date Due DTaP/Tdap/Td series (1 - Tdap) 10/22/1997 PAP AKA CERVICAL CYTOLOGY 10/22/1997 Influenza Age 5 to Adult 2018 Allergies as of 10/2/2018  Review Complete On: 10/2/2018 By: Vidal Velazquez MD  
  
 Severity Noted Reaction Type Reactions Pcn [Penicillins] High 10/27/2010    Swelling Hydralazine  2017    Other (comments) Lupus reaction Reglan [Metoclopramide]  2011    Swelling Of tongue Current Immunizations  Never Reviewed No immunizations on file. Not reviewed this visit You Were Diagnosed With   
  
 Codes Comments PCOS (polycystic ovarian syndrome)    -  Primary ICD-10-CM: E28.2 ICD-9-CM: 256.4 Prediabetes     ICD-10-CM: R73.03 
ICD-9-CM: 790.29 Morbid obesity ([facility] [address]     ICD-10-CM: E66.01 
ICD-9-CM: 278.01 Essential hypertension     ICD-10-CM: I10 
ICD-9-CM: 401.9 Vitals BP Pulse Temp Resp Height(growth percentile) Weight(growth percentile) 137/87 (BP 1 Location: Left arm, BP Patient Position: Sitting) 72 98.7 °F (37.1 °C) (Oral) 16 5' 5\" (1.651 m) 292 lb 1.6 oz (132.5 kg) SpO2 BMI OB Status Smoking Status 100% 48.61 kg/m2 Unknown Never Smoker BMI and BSA Data Body Mass Index Body Surface Area  
 48.61 kg/m 2 2.47 m 2 Preferred Pharmacy Pharmacy Name Phone [id number] Kehinde Miguel [address] [phone] Your Updated Medication List  
  
   
 This list is accurate as of 10/2/18  1:59 PM.  Always use your most recent med list.  
  
  
  
  
 acyclovir 400 mg tablet Commonly known as:  ZOVIRAX Take 400 mg by mouth daily. amLODIPine 5 mg tablet Commonly known as:  Alicja Leaver Take 1 Tab by mouth daily. candesartan-hydroCHLOROthiazide 32-25 mg Tab per tablet Commonly known as:  ATACAND HCT  
ONCE A DAY   STOP LOSARTAN HCTZ  
  
 citalopram 20 mg tablet Commonly known as:  Drake Pin Take 20 mg by mouth daily. cyclobenzaprine 10 mg tablet Commonly known as:  FLEXERIL Take 1 Tab by mouth as needed. fexofenadine 180 mg tablet Commonly known as:  Dertea Gash Take  by mouth. * glucose blood VI test strips strip Commonly known as:  Parish Lopez Test up to 2 times daily. Dx code E11.65  
  
 [facility] VERIO strip Generic drug:  glucose blood VI test strips TEST UP TO 2 TIMES DAILY. DX CODE E11.65 * BD ULTRA-FINE ORIG PEN NEEDLE 29 gauge x 1/2\" Ndle Generic drug:  Insulin Needles (Disposable) * Insulin Needles (Disposable) 31 gauge x 5/16\" Ndle Commonly known as:  BD ULTRA-FINE SHORT PEN NEEDLE Use once daily. Dx code E66.01  
  
 labetalol 200 mg tablet Commonly known as:  Sterling Abdoulaye Take 1 Tab by mouth three (3) times daily. Lancets Misc Test up to 2 times daily. Dx code E11.65  
  
 LORYNA (28) 3-0.02 mg Tab Generic drug:  drospirenone-ethinyl estradiol TAKE 1 TABLET BY MOUTH ONCE DAILY FOR 84 DAYS  
  
 metFORMIN 500 mg tablet Commonly known as:  GLUCOPHAGE Take 2 Tabs by mouth two (2) times daily (with meals). naproxen 500 mg tablet Commonly known as:  NAPROSYN  
TAKE 1 TABLET BY MOUTH TWICE A DAY AS NEEDED  
  
 phentermine-topiramate 7.5-46 mg Cm24 Commonly known as:  QSYMIA  
1 tab once daily PRENATAL 1+1 PO Take  by mouth daily. sertraline 50 mg tablet Commonly known as:  ZOLOFT Take 1 Tab by mouth daily. traMADol 50 mg tablet Commonly known as:  ULTRAM  
TAKE 1 TABLET BY MOUTH EVERY 4 HOURS AS NEEDED FOR PAIN  
  
 * Notice: This list has 4 medication(s) that are the same as other medications prescribed for you. Read the directions carefully, and ask your doctor or other care provider to review them with you. Prescriptions Sent to Pharmacy Refills  
 candesartan-hydroCHLOROthiazide (ATACAND HCT) 32-25 mg tab per tablet 6 Sig: ONCE A DAY   STOP LOSARTAN HCTZ Class: Normal  
 Pharmacy: [id number] Romiemarry JaciJasbir Amandanito [address] [place] [address] #: [phone] Follow-up Instructions Return in about 6 months (around 4/2/2019). Patient Instructions STOP    SAXENDA  3 mg a day STOP  Q-symia  
 
 
 
----------------------------------------------------------------------------------------------------------------------- 
 
 
 
uNIthroid 75 mcg  A day, on empty stomach with water only, no other meds or food or drinks   For next half hour Take any kind of vitamins, calcium, iron   Pills  4 hours later    bmn STOP 50 MCG DOSE Metformin 500 mg 2 pills twice a day with meals =--------------------------------------------------------------------- Low carbs diet 30 gm per  Meal  
--------------------------------------------------------------------------------------------------------- 
 
 
 
 
 
 
------------------------------------------------------------------------------------------------------ Do not skip meals Do not eat in between meals Reduce carbs- pasta, rice, potatoes, bread Do not drink juices or sodas  , CHEESE Stop cheese, fried foods and flour products Do not eat sugar free cookies and cakes Do not eat peanut butter Introducing [facility]! Dear Shakila Pickens: Thank you for requesting a MyCFastclickt account. Our records indicate that you already have an active MyChart account.   You can access your account anytime at [email or web address] Did you know that you can access your hospital and ER discharge instructions at any time in [employer]? You can also review all of your test results from your hospital stay or ER visit. Additional Information If you have questions, please visit the Frequently Asked Questions section of the Ext[employer] website at [email or web address] Remember, [employer] is NOT to be used for urgent needs. For medical emergencies, dial 911. Now available from your iPhone and Android! Please provide this summary of care documentation to your next provider. Your primary care clinician is listed as Souleymane Hanson. If you have any questions after today's visit, please call [phone].

## 2018-10-02 NOTE — PROGRESS NOTES
HISTORY OF PRESENT ILLNESS Pedro Luis King is a 40 y.o. female. HPI  
F/u for glucose intolerance, obesity  and Pcos after  April 2018 C/o edema GAINED 10 LBS She had a change on BP pill because of recall on valsartan-hctz   To  Losartan- HCTZ There is a change in  OCp and  She is not  Compliant with   Leena Ramus Old history Weight Loss  of 16     lbs She took decongestant y-day and she feels unwell today She used probiotics She did nto try q symia Review of Systems Constitutional: Negative. HENT: Negative. Eyes: Negative for pain and redness. Respiratory: Negative. Cardiovascular: Negative for chest pain, palpitations and leg swelling. Gastrointestinal: Negative. Negative for constipation. Genitourinary: Negative. Musculoskeletal: Negative for myalgias. Skin: Negative. Neurological: Negative. Endo/Heme/Allergies: Negative. Psychiatric/Behavioral: Negative for depression and memory loss. The patient does not have insomnia. Physical Exam  
Constitutional: She is oriented to person, place, and time. She appears well-developed and well-nourished. HENT:  
Head: Normocephalic. Eyes: Conjunctivae and extraocular motions are normal. Pupils are equal, round, and reactive to light. Neck: Normal range of motion. Neck supple. No JVD present. No tracheal deviation present. No thyromegaly present. Cardiovascular: Normal rate, regular rhythm and normal heart sounds. No murmur heard. Pulmonary/Chest: Breath sounds normal.  
Abdominal: Soft. Bowel sounds are normal.  
Musculoskeletal: Normal range of motion. Lymphadenopathy:  
She has no cervical adenopathy. Neurological: She is alert and oriented to person, place, and time. She has normal reflexes. Skin: Skin is warm. Psychiatric: She has a normal mood and affect. Lab Results Component Value Date/Time  Hemoglobin A1c 5.8 (H) 07/27/2018 08:13 AM  
 Hemoglobin A1c 5.9 (H) 04/03/2018 04:48 PM  
 Hemoglobin A1c 5.7 (H) 09/18/2017 05:23 PM  
 Hemoglobin A1c, External 5.8 07/29/2016 Hemoglobin A1c, External 5.7 01/08/2016 Glucose 101 (H) 07/27/2018 08:13 AM  
 Glucose (POC) 135 (H) 03/17/2011 01:28 PM  
 Glucose  04/03/2018 04:22 PM  
 Microalb/Creat ratio (ug/mg creat.) 3.1 07/27/2018 08:13 AM  
 LDL, calculated 75 07/27/2018 08:13 AM  
 Creatinine 0.74 07/27/2018 08:13 AM  
  
Lab Results Component Value Date/Time Cholesterol, total 189 07/27/2018 08:13 AM  
 HDL Cholesterol 74 07/27/2018 08:13 AM  
 LDL, calculated 75 07/27/2018 08:13 AM  
 Triglyceride 200 (H) 07/27/2018 08:13 AM  
 
 
Lab Results Component Value Date/Time ALT (SGPT) 12 07/27/2018 08:13 AM  
 AST (SGOT) 15 07/27/2018 08:13 AM  
 Alk. phosphatase 64 07/27/2018 08:13 AM  
 Bilirubin, total <0.2 07/27/2018 08:13 AM  
 
 
 
 
 
ASSESSMENT and PLAN 1. PCOS : A1C   Is     5.8 %    FROM July 2018  COMPARED TO   6.1 %     From    toda y, April 2018 ,         Compared to     5.9 %     From   May 2017      5.8 %    From July 2016 labs ;    5.8 %    From    Oct 2015  Compared to   5.2 %    From    July 2015 compared to  5.9 %  Again from  March 2015 compared to   From nov 2014 compared to   5.9 %  From July 2014 compared to  6 % from jan 2014 compared to 5.4 % from July 2013 compared to  6 % from jan 2013 ;  5.5 % from aug 2012 compared to last 5.4 % in jan 2012. Continue on  metformin 500 mg ( 2 pills- 1 gm) bid She is not hopeful of getting  pregnant again. ( got  ) Dietary compliance she has to maintained For hirsutism will do spironolactone 25 mg bid E[facility] 2. HTN :uncontrolled, on labetalol 200 mg tid, and hyzaar She felt like she has more swelling of legs For benefit of doubt, change to atacand -hctz Had to change from diovan combo because of recall Infertility, secondary- s/p tubal adhesionolysis 3. Sub-clinical hypothyroidism :  Changing to  Unithroid 75 mcg 7 days a week 4. Obesity S/p LAP BANDING 2006  : belviq did nto help her , made it worse Body mass index is 48.61 kg/(m^2). Ebb Lotbrionna got covered , but  not using it as she had some bruises Asked her to stop fish oil supplements She never tried  q-SYMIA, requested refill 5. Vit d def - otc supplements 6. Irregular cycles - since off mirena - She will be starting  LO LOestrin  
 
 
 
> 50 % of time is spent on counseling Patient voiced understanding her plan of care Labs today F/u in 6 months

## 2018-10-02 NOTE — PATIENT INSTRUCTIONS
STOP    SAXENDA  3 mg a day STOP  Q-symia  
 
 
 
----------------------------------------------------------------------------------------------------------------------- 
 
 
 
uNIthroid 75 mcg  A day, on empty stomach with water only, no other meds or food or drinks   For next half hour Take any kind of vitamins, calcium, iron   Pills  4 hours later    bmn STOP 50 MCG DOSE Metformin 500 mg 2 pills twice a day with meals =--------------------------------------------------------------------- Low carbs diet 30 gm per  Meal  
--------------------------------------------------------------------------------------------------------- 
 
 
 
 
 
 
------------------------------------------------------------------------------------------------------ Do not skip meals Do not eat in between meals Reduce carbs- pasta, rice, potatoes, bread Do not drink juices or sodas  , CHEESE Stop cheese, fried foods and flour products Do not eat sugar free cookies and cakes Do not eat peanut butter

## 2018-10-03 NOTE — PROGRESS NOTES
Inform pt that I checked spironolactone is okay to start on unwanted hair growth     No drug induced lupus will happen       But it is like a fluid pill, helps for edema and also for BP     I gave her a new BP pill, so she will try new BP pill and then she will try the pill for hair growth

## 2018-10-25 DIAGNOSIS — R73.03 PREDIABETES: ICD-10-CM

## 2018-10-25 DIAGNOSIS — I10 ESSENTIAL HYPERTENSION: ICD-10-CM

## 2018-10-25 DIAGNOSIS — E28.2 PCOS (POLYCYSTIC OVARIAN SYNDROME): ICD-10-CM

## 2018-10-25 DIAGNOSIS — E66.01 MORBID OBESITY (HCC): ICD-10-CM

## 2018-10-25 RX ORDER — CANDESARTAN CILEXETIL AND HYDROCHLOROTHIAZIDE 32; 25 MG/1; MG/1
TABLET ORAL
Qty: 90 TAB | Refills: 4 | Status: SHIPPED | OUTPATIENT
Start: 2018-10-25 | End: 2019-01-04 | Stop reason: SDUPTHER

## 2018-12-05 RX ORDER — LEVOTHYROXINE SODIUM 75 UG/1
TABLET ORAL
Qty: 90 TAB | Refills: 4 | Status: SHIPPED | OUTPATIENT
Start: 2018-12-05 | End: 2020-03-03 | Stop reason: SDUPTHER

## 2019-01-04 DIAGNOSIS — I10 ESSENTIAL HYPERTENSION: ICD-10-CM

## 2019-01-04 DIAGNOSIS — E28.2 PCOS (POLYCYSTIC OVARIAN SYNDROME): ICD-10-CM

## 2019-01-04 DIAGNOSIS — R73.03 PREDIABETES: ICD-10-CM

## 2019-01-04 DIAGNOSIS — E66.01 MORBID OBESITY (HCC): ICD-10-CM

## 2019-01-04 RX ORDER — CANDESARTAN CILEXETIL AND HYDROCHLOROTHIAZIDE 32; 25 MG/1; MG/1
TABLET ORAL
Qty: 90 TAB | Refills: 4 | Status: SHIPPED | OUTPATIENT
Start: 2019-01-04 | End: 2019-05-08 | Stop reason: ALTCHOICE

## 2019-03-05 RX ORDER — SPIRONOLACTONE 25 MG/1
25 TABLET ORAL 2 TIMES DAILY
Qty: 60 TAB | Refills: 6 | Status: SHIPPED | OUTPATIENT
Start: 2019-03-05 | End: 2019-03-06 | Stop reason: SDUPTHER

## 2019-03-06 RX ORDER — SPIRONOLACTONE 25 MG/1
25 TABLET ORAL 2 TIMES DAILY
Qty: 180 TAB | Refills: 4 | Status: SHIPPED | OUTPATIENT
Start: 2019-03-06 | End: 2020-03-02 | Stop reason: ALTCHOICE

## 2019-04-05 ENCOUNTER — TELEPHONE (OUTPATIENT)
Dept: ENDOCRINOLOGY | Age: 44
End: 2019-04-05

## 2019-04-05 DIAGNOSIS — E03.8 SUBCLINICAL HYPOTHYROIDISM: Primary | ICD-10-CM

## 2019-04-05 NOTE — TELEPHONE ENCOUNTER
Order placed for pt,  per Verbal Order with read back from Dr Amelia Storey on 4/5/2019.     Orders mailed to address on file

## 2019-04-05 NOTE — TELEPHONE ENCOUNTER
----- Message from Glenn Wise sent at 4/4/2019  5:26 PM EDT -----  Regarding: Lali Munguia / telephone   Patient is requesting a call back regarding if she can go to a lab facility on her own and do labs that way instead of having to come in twice Best contact [phone]

## 2019-04-27 LAB
T4 FREE SERPL-MCNC: 1.41 NG/DL (ref 0.82–1.77)
TSH SERPL DL<=0.005 MIU/L-ACNC: 3 UIU/ML (ref 0.45–4.5)

## 2019-05-08 ENCOUNTER — OFFICE VISIT (OUTPATIENT)
Dept: ENDOCRINOLOGY | Age: 44
End: 2019-05-08

## 2019-05-08 VITALS
WEIGHT: 293 LBS | HEIGHT: 65 IN | RESPIRATION RATE: 20 BRPM | HEART RATE: 63 BPM | BODY MASS INDEX: 48.82 KG/M2 | DIASTOLIC BLOOD PRESSURE: 81 MMHG | OXYGEN SATURATION: 96 % | TEMPERATURE: 98.2 F | SYSTOLIC BLOOD PRESSURE: 122 MMHG

## 2019-05-08 DIAGNOSIS — I10 ESSENTIAL HYPERTENSION: ICD-10-CM

## 2019-05-08 DIAGNOSIS — E28.2 PCOS (POLYCYSTIC OVARIAN SYNDROME): ICD-10-CM

## 2019-05-08 DIAGNOSIS — E03.8 SUBCLINICAL HYPOTHYROIDISM: Primary | ICD-10-CM

## 2019-05-08 DIAGNOSIS — R73.03 PREDIABETES: ICD-10-CM

## 2019-05-08 LAB — HBA1C MFR BLD HPLC: 5.8 %

## 2019-05-08 RX ORDER — DROSPIRENONE AND ETHINYL ESTRADIOL 0.02-3(28)
KIT ORAL DAILY
COMMUNITY

## 2019-05-08 RX ORDER — LOSARTAN POTASSIUM AND HYDROCHLOROTHIAZIDE 25; 100 MG/1; MG/1
1 TABLET ORAL DAILY
COMMUNITY
End: 2020-01-16 | Stop reason: ALTCHOICE

## 2019-05-08 NOTE — LETTER
5/8/19 Patient: Makenzie Diaz YOB: 1975 Date of Visit: 5/8/2019 Tanja Kinsey MD 
[address] [facility] [address] [place] [address] VIA Facsimile: [phone] Dear Tanja Kinsey MD, Thank you for referring Ms. Jhonatan Torres to [address] [address] for evaluation. My notes for this consultation are attached. If you have questions, please do not hesitate to call me. I look forward to following your patient along with you. Sincerely, Payal Haddad MD

## 2019-05-08 NOTE — PROGRESS NOTES
HISTORY OF PRESENT ILLNESS Jasiel López is a 44 y.o. female. HPI  
F/u for glucose intolerance, HYPOTHYRODISM AND HTN  AND obesity   Pcos after  Oct  2018 Pt is diagnosed with sleep apnea Wearing c-pap since April 2019 Gained 5 lb COMPLIANT WITH Jyoti Collazo Old history C/o edema GAINED 10 LBS She had a change on BP pill because of recall on valsartan-hctz   To  Losartan- HCTZ There is a change in  OCp and  She is not  Compliant with   [place] Old history Weight Loss  of 16   lbs She took decongestant y-day and she feels unwell today She used probiotics She did nto try q symia Review of Systems Constitutional: Negative. HENT: Negative. Eyes: Negative for pain and redness. Respiratory: Negative. Cardiovascular: Negative for chest pain, palpitations and leg swelling. Gastrointestinal: Negative. Negative for constipation. Genitourinary: Negative. Musculoskeletal: Negative for myalgias. Skin: Negative. Neurological: Negative. Endo/Heme/Allergies: Negative. Psychiatric/Behavioral: Negative for depression and memory loss. The patient does not have insomnia. Physical Exam  
Constitutional: She is oriented to person, place, and time. She appears well-developed and well-nourished. HENT:  
Head: Normocephalic. Eyes: Conjunctivae and extraocular motions are normal. Pupils are equal, round, and reactive to light. Neck: Normal range of motion. Neck supple. No JVD present. No tracheal deviation present. No thyromegaly present. Cardiovascular: Normal rate, regular rhythm and normal heart sounds. No murmur heard. Pulmonary/Chest: Breath sounds normal.  
Abdominal: Soft. Bowel sounds are normal.  
Musculoskeletal: Normal range of motion. Lymphadenopathy:  
She has no cervical adenopathy. Neurological: She is alert and oriented to person, place, and time. She has normal reflexes. Skin: Skin is warm. Psychiatric: She has a normal mood and affect. Lab Results Component Value Date/Time Hemoglobin A1c 5.8 (H) 07/27/2018 08:13 AM  
 Hemoglobin A1c 5.9 (H) 04/03/2018 04:48 PM  
 Hemoglobin A1c 5.7 (H) 09/18/2017 05:23 PM  
 Hemoglobin A1c, External 5.8 07/29/2016 Hemoglobin A1c, External 5.7 01/08/2016 Glucose 101 (H) 07/27/2018 08:13 AM  
 Glucose (POC) 135 (H) 03/17/2011 01:28 PM  
 Glucose  04/03/2018 04:22 PM  
 Microalb/Creat ratio (ug/mg creat.) 3.1 07/27/2018 08:13 AM  
 LDL, calculated 75 07/27/2018 08:13 AM  
 Creatinine 0.74 07/27/2018 08:13 AM  
  
Lab Results Component Value Date/Time Cholesterol, total 189 07/27/2018 08:13 AM  
 HDL Cholesterol 74 07/27/2018 08:13 AM  
 LDL, calculated 75 07/27/2018 08:13 AM  
 Triglyceride 200 (H) 07/27/2018 08:13 AM  
 
 
Lab Results Component Value Date/Time ALT (SGPT) 12 07/27/2018 08:13 AM  
 AST (SGOT) 15 07/27/2018 08:13 AM  
 Alk. phosphatase 64 07/27/2018 08:13 AM  
 Bilirubin, total <0.2 07/27/2018 08:13 AM  
 
 
 
 
 
ASSESSMENT and PLAN 1. HYPOTHYROIDISM  :   EUTHYROID ON  
UNITHROID 75 MCG A DAY 2. PCOS / PREDIABETES  : A1C   Is     5.8 %    FROM July 2018  COMPARED TO   6.1 %     From    toda y, April 2018 ,         Compared to     5.9 %     From   May 2017      5.8 %    From July 2016 labs ;    5.8 %    From    Oct 2015  Compared to   5.2 %    From    July 2015 compared to  5.9 %  Again from  March 2015 Continue on  metformin 500 mg ( 2 pills- 1 gm) bid She is not hopeful of getting  pregnant again. ( got  ) Dietary compliance she has to maintained For hirsutism she takes  spironolactone 25 mg bid Oris Kramer 3. HTN :  WELL controlled, on labetalol 200 mg tid, and hyzaar 100-12.5 AND ( SHE IS ON SPIRNOLACTONE ) Infertility, secondary- s/p tubal adhesionolysis 4. Obesity S/p LAP BANDING 2006  : belviq did nto help her , made it worse Body mass index is 49.56 kg/m². Aprilshasta Luzs got covered , but  not using it as she had some bruises She never tried  q-SYMIA She definitely has several comorbid states to be on weight loss meds Asking her to check her plan for coverage 5. Vit d def - otc supplements 6. Irregular cycles - ON OCP  
 
 
> 50 % of time is spent on counseling Patient voiced understanding her plan of care Labs today F/u in 6 months

## 2019-05-08 NOTE — PATIENT INSTRUCTIONS
SAXENDA  3 mg a day  ( SHOT )  WEIGHT LOSS  
 
Q-symia (PILL ) CONTRAVE ( PILL) BELVIQ XR  ( PILL ) 
 
----------------------------------------------------------------- 
 
VICTOZA CAN BE TRIED  
 
--------------------------------------------------------------------------------------------------------------- 
 
uNIthroid 75 mcg  A day, on empty stomach with water only, no other meds or food or drinks   For next half hour Take any kind of vitamins, calcium, iron   Pills  4 hours later    bmn Metformin 500 mg 2 pills twice a day with meals =--------------------------------------------------------------------- Low carbs diet 30 gm per  Meal  
--------------------------------------------------------------------------------------------------------- Do not skip meals Do not eat in between meals Reduce carbs- pasta, rice, potatoes, bread Do not drink juices or sodas  , CHEESE Stop cheese, fried foods and flour products Do not eat sugar free cookies and cakes Do not eat peanut butter

## 2019-05-08 NOTE — PROGRESS NOTES
1. Have you been to the ER, urgent care clinic since your last visit? No Hospitalized since your last visit? No 
 
2. Have you seen or consulted any other health care providers outside of the [address] [place] [address] since your last visit? Include any pap smears or colon screening. No  
 
Wt Readings from Last 3 Encounters:  
05/08/19 297 lb 12.8 oz (135.1 kg) 10/02/18 292 lb 1.6 oz (132.5 kg) 04/03/18 282 lb 1.6 oz (128 kg) Temp Readings from Last 3 Encounters:  
05/08/19 98.2 °F (36.8 °C) (Oral) 10/02/18 98.7 °F (37.1 °C) (Oral) 04/03/18 95.2 °F (35.1 °C) (Oral) BP Readings from Last 3 Encounters:  
05/08/19 122/81  
10/02/18 137/87  
04/03/18 117/80 Pulse Readings from Last 3 Encounters:  
05/08/19 63  
10/02/18 72  
04/03/18 78

## 2019-05-10 RX ORDER — ACYCLOVIR 400 MG/1
TABLET ORAL
COMMUNITY

## 2019-05-14 ENCOUNTER — DOCUMENTATION ONLY (OUTPATIENT)
Dept: ENDOCRINOLOGY | Age: 44
End: 2019-05-14

## 2019-05-14 NOTE — PROGRESS NOTES
Explain to patient how QSYMIA IS TAKEN     LOWEST 3.75 MG - IGNORING     7.5 DOSE WE SEND IT FOR 2 MONTH TO SEE HOW SHE TOLERATES     WE CAN GO TO THIRD DOSE 11.25 WHEN SHE CALLS FOR HIGHER DOSE         SEND THE PRESCRIPTION AT 7.5 DOSE ONCE  A DAY   TO  LOCAL  PHARMACY   AS HER PLAN COVERS WEIGHT LOSS MEDS AND NEEDS A PRIOR AUTH  BE  DONE     Daniella Grant MD

## 2019-05-16 DIAGNOSIS — E28.2 PCOS (POLYCYSTIC OVARIAN SYNDROME): ICD-10-CM

## 2019-05-16 DIAGNOSIS — R73.03 PREDIABETES: ICD-10-CM

## 2019-07-01 DIAGNOSIS — R73.03 PREDIABETES: ICD-10-CM

## 2019-07-01 DIAGNOSIS — E28.2 PCOS (POLYCYSTIC OVARIAN SYNDROME): ICD-10-CM

## 2019-07-01 RX ORDER — METFORMIN HYDROCHLORIDE 500 MG/1
TABLET ORAL
Qty: 360 TAB | Refills: 4 | Status: SHIPPED | OUTPATIENT
Start: 2019-07-01 | End: 2020-03-03 | Stop reason: SDUPTHER

## 2019-08-23 ENCOUNTER — TELEPHONE (OUTPATIENT)
Dept: SURGERY | Age: 44
End: 2019-08-23

## 2019-08-23 NOTE — TELEPHONE ENCOUNTER
I called the patient and let her know that Shekhar Da Silva had not received an e-mail that the patient said she had sent. Patient said she could not get the e-mail to forward. Correct e-mail address was given and will make [facility] aware so she can look for it to fill out the accommodation form she has sent in to us. Pt said if she has any questions to please email her back as she has a hard time using phone at work.

## 2019-09-12 ENCOUNTER — OFFICE VISIT (OUTPATIENT)
Dept: SURGERY | Age: 44
End: 2019-09-12

## 2019-09-12 VITALS
SYSTOLIC BLOOD PRESSURE: 110 MMHG | TEMPERATURE: 98.4 F | HEIGHT: 65 IN | WEIGHT: 290.5 LBS | HEART RATE: 80 BPM | BODY MASS INDEX: 48.4 KG/M2 | OXYGEN SATURATION: 98 % | RESPIRATION RATE: 18 BRPM | DIASTOLIC BLOOD PRESSURE: 70 MMHG

## 2019-09-12 DIAGNOSIS — R13.19 INTERMITTENT DYSPHAGIA: ICD-10-CM

## 2019-09-12 DIAGNOSIS — R63.5 WEIGHT GAIN: ICD-10-CM

## 2019-09-12 DIAGNOSIS — E66.01 MORBID OBESITY WITH BMI OF 45.0-49.9, ADULT (HCC): Primary | ICD-10-CM

## 2019-09-12 DIAGNOSIS — L30.4 INTERTRIGO: ICD-10-CM

## 2019-09-12 RX ORDER — CANDESARTAN CILEXETIL AND HYDROCHLOROTHIAZIDE 32; 25 MG/1; MG/1
1 TABLET ORAL DAILY
COMMUNITY
End: 2020-01-16

## 2019-09-12 NOTE — PROGRESS NOTES
1. Have you been to the ER, urgent care clinic since your last visit? Hospitalized since your last visit? No    2. Have you seen or consulted any other health care providers outside of the [address] [place] [address] since your last visit? Include any pap smears or colon screening. No    [facility]  Body composition    female  44 y.o. Vitals:    09/12/19 1538   BP: 110/70   Pulse: 80   Resp: 18   Temp: 98.4 °F (36.9 °C)   TempSrc: Oral   SpO2: 98%   Weight: 290 lb 8 oz (131.8 kg)   Height: 5' 5\" (1.651 m)     Body mass index is 48.34 kg/m².      H&P weight on 10/3/2006 was 328 pounds  Last office visit weight on 4/3/17 was 283 pounds

## 2019-09-13 NOTE — PROGRESS NOTES
Chief Complaint   Patient presents with    Surgical Follow-up     12 years 11 months (10/3/2006) S/P Lap Adjustable Band Placement 10cm-by Dr. Carmel Jaime with Dr. Joselyn marshall. - Down 37.5 pounds -gained 7.5 pounds      Lev Akins is well known to me with remote history of adjustable gastric band for treatment of morbid obesity. She has not been in follow up for 2 years. LAP BAND FILL HX 4/3/2017   Band type: 10cm   Previous fill amount: 1.7   Amount Removed:    Amount added: 0.3   Current total volume: 2     She has been doing ok and no new issues. Originally called to be seen so we could do intermittent leave papers for her. She has gotten that taken care of with her PCP / Endocrine    Weight is up, but she is not at her high   Portions usually > 1 cup   \"tries to watch it\"  Dysphagia and regurgitation every few months   No night or post prandial cough   No port pain   Reports blood sugars are well controlled and A1C < 6%    Son now lives in [place] and she sees him once per month   C/o rash under right axilla and vaginal area. Reports chronic yeast infections and \"I think that is what is under my arm\". Itchy and musty smell     Visit Vitals  /70 (BP 1 Location: Left arm, BP Patient Position: Sitting)   Pulse 80   Temp 98.4 °F (36.9 °C) (Oral)   Resp 18   Ht 5' 5\" (1.651 m)   Wt 290 lb 8 oz (131.8 kg)   SpO2 98%   BMI 48.34 kg/m²     A + O x 3  Chest  CTA  COR  RRR  Axilla right axilla with hyperpigmentation, faint fine sandpapery rash in fold with musty odor; left axilla with hyperpigmentation, no rash   ABD Soft, obese, port palpable and NT/ND, +BS  EXT No edema; ambulating independently      ICD-10-CM ICD-9-CM    1. Morbid obesity with BMI of 45.0-49.9, adult ([facility]) E66.01 278.01 XR UPPER GI SERIES W KUB    Z68.42 V85.42    2. Weight gain R63.5 783.1 XR UPPER GI SERIES W KUB   3. Intermittent dysphagia R13.19 787.29 XR UPPER GI SERIES W KUB   4.  Intertrigo L30.4 695.89      Remote gastric band and she has a 10 cm   Recommended checking band status with UGI and then I can follow up with her on the phone   Reviewed band diet and behavior   RD available at no charge   Can use some tea tree oil on rash and keep clean and dry, avoid shaving   Reviewed red zone symptoms   Awilda Barnett verbalized understanding and questions were answered to the best of my knowledge and ability. UGI  educational materials were provided. 26 minutes spent in face to face with Awilda Barnett > 50% counseling.

## 2020-01-15 LAB
CREATININE, EXTERNAL: 0.69
HBA1C MFR BLD HPLC: 6.1 %
LDL-C, EXTERNAL: 98

## 2020-01-16 RX ORDER — CANDESARTAN CILEXETIL AND HYDROCHLOROTHIAZIDE 32; 25 MG/1; MG/1
TABLET ORAL
Qty: 90 TAB | Refills: 4 | Status: SHIPPED | OUTPATIENT
Start: 2020-01-16 | End: 2022-01-03 | Stop reason: SDUPTHER

## 2020-03-02 ENCOUNTER — HOSPITAL ENCOUNTER (OUTPATIENT)
Dept: LAB | Age: 45
Discharge: HOME | End: 2020-03-02

## 2020-03-02 ENCOUNTER — OFFICE VISIT (OUTPATIENT)
Dept: ENDOCRINOLOGY | Age: 45
End: 2020-03-02

## 2020-03-02 VITALS
RESPIRATION RATE: 20 BRPM | HEIGHT: 65 IN | HEART RATE: 75 BPM | BODY MASS INDEX: 48.82 KG/M2 | SYSTOLIC BLOOD PRESSURE: 148 MMHG | DIASTOLIC BLOOD PRESSURE: 98 MMHG | OXYGEN SATURATION: 98 % | TEMPERATURE: 98.6 F | WEIGHT: 293 LBS

## 2020-03-02 DIAGNOSIS — E03.9 HYPOTHYROIDISM, UNSPECIFIED TYPE: Primary | ICD-10-CM

## 2020-03-02 DIAGNOSIS — E03.9 HYPOTHYROIDISM, UNSPECIFIED TYPE: ICD-10-CM

## 2020-03-02 DIAGNOSIS — I10 ESSENTIAL HYPERTENSION: ICD-10-CM

## 2020-03-02 DIAGNOSIS — E66.01 CLASS 3 SEVERE OBESITY DUE TO EXCESS CALORIES WITH SERIOUS COMORBIDITY AND BODY MASS INDEX (BMI) OF 45.0 TO 49.9 IN ADULT (HCC): ICD-10-CM

## 2020-03-02 LAB — TSH SERPL DL<=0.05 MIU/L-ACNC: 1.12 UIU/ML (ref 0.36–3.74)

## 2020-03-02 NOTE — LETTER
3/2/20 Patient: Natasha Ceballos YOB: 1975 Date of Visit: 3/2/2020 Sera Lewis, [address] [facility] [address] [facility] [address] VIA Facsimile: [phone] Dear Sera Lewis MD, Thank you for referring Ms. Nicole Jackson to [address] [address] for evaluation. My notes for this consultation are attached. If you have questions, please do not hesitate to call me. I look forward to following your patient along with you. Sincerely, Cindy Bryant MD

## 2020-03-02 NOTE — PATIENT INSTRUCTIONS
uNIthroid 75 mcg  A day, on empty stomach with water only, no other meds or food or drinks   For next half hour Take any kind of vitamins, calcium, iron   Pills  4 hours later    bmn Metformin 500 mg 2 pills twice a day with meals =--------------------------------------------------------------------- Low carbs diet 30 gm per  Meal  
----------------------------------------------------------------------------------- Do not skip meals Do not eat in between meals Reduce carbs- pasta, rice, potatoes, bread Do not drink juices or sodas  , CHEESE Stop cheese, fried foods and flour products Do not eat sugar free cookies and cakes Do not eat peanut butter

## 2020-03-02 NOTE — PROGRESS NOTES
1. Have you been to the ER, urgent care clinic since your last visit? No  Hospitalized since your last visit? No    2. Have you seen or consulted any other health care providers outside of the [address] [place] [address] since your last visit? Include any pap smears or colon screening.  No    Wt Readings from Last 3 Encounters:   03/02/20 293 lb 11.2 oz (133.2 kg)   09/12/19 290 lb 8 oz (131.8 kg)   05/08/19 297 lb 12.8 oz (135.1 kg)     Temp Readings from Last 3 Encounters:   03/02/20 98.6 °F (37 °C) (Oral)   09/12/19 98.4 °F (36.9 °C) (Oral)   05/08/19 98.2 °F (36.8 °C) (Oral)     BP Readings from Last 3 Encounters:   03/02/20 (!) 148/98   09/12/19 110/70   05/08/19 122/81     Pulse Readings from Last 3 Encounters:   03/02/20 75   09/12/19 80   05/08/19 63     Lab Results   Component Value Date/Time    Hemoglobin A1c 5.8 (H) 07/27/2018 08:13 AM    Hemoglobin A1c (POC) 5.8 05/08/2019 05:02 PM    Hemoglobin A1c, External 5.8 07/29/2016

## 2020-03-03 DIAGNOSIS — R73.03 PREDIABETES: ICD-10-CM

## 2020-03-03 DIAGNOSIS — E03.9 HYPOTHYROIDISM, UNSPECIFIED TYPE: ICD-10-CM

## 2020-03-03 DIAGNOSIS — E03.8 SUBCLINICAL HYPOTHYROIDISM: ICD-10-CM

## 2020-03-03 DIAGNOSIS — E28.2 PCOS (POLYCYSTIC OVARIAN SYNDROME): Primary | ICD-10-CM

## 2020-03-03 DIAGNOSIS — E28.2 PCOS (POLYCYSTIC OVARIAN SYNDROME): ICD-10-CM

## 2020-03-03 RX ORDER — METFORMIN HYDROCHLORIDE 500 MG/1
TABLET ORAL
Qty: 360 TAB | Refills: 4 | Status: SHIPPED | OUTPATIENT
Start: 2020-03-03 | End: 2022-06-01 | Stop reason: SDUPTHER

## 2020-03-03 RX ORDER — LEVOTHYROXINE SODIUM 75 UG/1
TABLET ORAL
Qty: 90 TAB | Refills: 4 | Status: SHIPPED | OUTPATIENT
Start: 2020-03-03 | End: 2022-01-03 | Stop reason: SDUPTHER

## 2020-04-16 DIAGNOSIS — R73.03 PREDIABETES: ICD-10-CM

## 2020-04-16 DIAGNOSIS — E28.2 PCOS (POLYCYSTIC OVARIAN SYNDROME): ICD-10-CM

## 2020-04-17 RX ORDER — LANCETS
EACH MISCELLANEOUS
Qty: 200 PACKAGE | Refills: 4 | Status: SHIPPED | OUTPATIENT
Start: 2020-04-17

## 2020-04-17 RX ORDER — BLOOD-GLUCOSE METER
EACH MISCELLANEOUS
Qty: 1 EACH | Refills: 0 | Status: SHIPPED | OUTPATIENT
Start: 2020-04-17 | End: 2022-01-03 | Stop reason: SDUPTHER

## 2020-04-24 ENCOUNTER — TELEPHONE (OUTPATIENT)
Dept: ENDOCRINOLOGY | Age: 45
End: 2020-04-24

## 2020-04-24 NOTE — TELEPHONE ENCOUNTER
----- Message from Elena Watsonyanira Page sent at 2020 12:29 PM EDT -----  Regarding: Dr. Shefali Sinha Refill  Medication Refill    :  1975   ID numbers  #[id number] [id number]    Caller (if not patient): n/a  Relationship of caller (if not patient): n/a   Best contact number(s): [phone]  Name of medication and dosage if known: One Touch Velero Test Strips  Is patient out of this medication (yes/no): Yes  Pharmacy name: [facility]    Pharmacy listed in chart? (yes/no): Yes  Pharmacy phone number: Phone:   Date of last visit:    2020 03:15 PM     Patient received the incorrect test strips and is requesting the correct rx be sent.  Patient would like to discuss the copay for the incorrect product

## 2020-04-29 ENCOUNTER — TELEPHONE (OUTPATIENT)
Dept: ENDOCRINOLOGY | Age: 45
End: 2020-04-29

## 2020-06-03 RX ORDER — SPIRONOLACTONE 25 MG/1
25 TABLET ORAL 2 TIMES DAILY
Qty: 180 TAB | Refills: 1 | Status: SHIPPED | OUTPATIENT
Start: 2020-06-03 | End: 2020-09-03 | Stop reason: ALTCHOICE

## 2020-07-20 DIAGNOSIS — E03.9 HYPOTHYROIDISM, UNSPECIFIED TYPE: ICD-10-CM

## 2020-07-20 DIAGNOSIS — I10 ESSENTIAL HYPERTENSION: ICD-10-CM

## 2020-07-20 DIAGNOSIS — R73.03 PREDIABETES: Primary | ICD-10-CM

## 2020-07-20 DIAGNOSIS — E28.2 PCOS (POLYCYSTIC OVARIAN SYNDROME): ICD-10-CM

## 2020-07-27 DIAGNOSIS — I10 ESSENTIAL HYPERTENSION: ICD-10-CM

## 2020-07-27 DIAGNOSIS — E03.9 HYPOTHYROIDISM, UNSPECIFIED TYPE: ICD-10-CM

## 2020-07-27 DIAGNOSIS — E28.2 PCOS (POLYCYSTIC OVARIAN SYNDROME): ICD-10-CM

## 2020-07-27 DIAGNOSIS — R73.03 PREDIABETES: ICD-10-CM

## 2020-09-03 ENCOUNTER — OFFICE VISIT (OUTPATIENT)
Dept: ENDOCRINOLOGY | Age: 45
End: 2020-09-03
Payer: COMMERCIAL

## 2020-09-03 VITALS
BODY MASS INDEX: 48.82 KG/M2 | TEMPERATURE: 98.8 F | HEIGHT: 65 IN | SYSTOLIC BLOOD PRESSURE: 138 MMHG | RESPIRATION RATE: 20 BRPM | WEIGHT: 293 LBS | HEART RATE: 77 BPM | OXYGEN SATURATION: 99 % | DIASTOLIC BLOOD PRESSURE: 85 MMHG

## 2020-09-03 DIAGNOSIS — E03.9 HYPOTHYROIDISM, UNSPECIFIED TYPE: Primary | ICD-10-CM

## 2020-09-03 DIAGNOSIS — I10 ESSENTIAL HYPERTENSION: ICD-10-CM

## 2020-09-03 DIAGNOSIS — E28.2 PCOS (POLYCYSTIC OVARIAN SYNDROME): ICD-10-CM

## 2020-09-03 DIAGNOSIS — R73.03 PREDIABETES: ICD-10-CM

## 2020-09-03 LAB — TSH SERPL DL<=0.05 MIU/L-ACNC: 2.04 UIU/ML (ref 0.36–3.74)

## 2020-09-03 PROCEDURE — 99214 OFFICE O/P EST MOD 30 MIN: CPT | Performed by: INTERNAL MEDICINE

## 2020-09-03 RX ORDER — PHENTERMINE AND TOPIRAMATE 7.5; 46 MG/1; MG/1
CAPSULE, EXTENDED RELEASE ORAL
Qty: 30 CAP | Refills: 4
Start: 2020-09-03 | End: 2022-01-03 | Stop reason: SDUPTHER

## 2020-09-03 NOTE — PROGRESS NOTES
1. Have you been to the ER, urgent care clinic since your last visit? No Hospitalized since your last visit? No    2. Have you seen or consulted any other health care providers outside of the [address] [place] [address] since your last visit? Include any pap smears or colon screening.  No     Wt Readings from Last 3 Encounters:   09/03/20 294 lb (133.4 kg)   03/02/20 293 lb 11.2 oz (133.2 kg)   09/12/19 290 lb 8 oz (131.8 kg)     Temp Readings from Last 3 Encounters:   09/03/20 98.8 °F (37.1 °C) (Oral)   03/02/20 98.6 °F (37 °C) (Oral)   09/12/19 98.4 °F (36.9 °C) (Oral)     BP Readings from Last 3 Encounters:   09/03/20 138/85   03/02/20 (!) 148/98   09/12/19 110/70     Pulse Readings from Last 3 Encounters:   09/03/20 77   03/02/20 75   09/12/19 80

## 2020-09-03 NOTE — PATIENT INSTRUCTIONS
unithroid 75 mcg  A day, on empty stomach with water only, no other meds or food or drinks   For next half hour Take any kind of vitamins, calcium, iron   Pills  4 hours later    bmn 
 
 
------------------------------------------------------------------------------------- Metformin 500 mg 2 pills twice a day with meals =--------------------------------------------------------------------- 
Keep one week of food and beverage log other than water intake  ( anything every thing ) in a systematic fashion in a journal or on our log sheets if given Start logging the carbohydrates in grams for every food item  
using the carb counting sheet ( simplest )  
 
OR The books ( nutrition in [employer] or [employer]) OR Use the \" my fitness pal \" feliciano on your phones 
 
----------------------------------------------------------------------------------- [facility] Do not skip meals Do not eat in between meals Reduce carbs- pasta, rice, potatoes, bread Do not drink juices or sodas, even they are calorie zero or diet drinks Do not eat peanut butter Do not eat sugar free cookies and cakes Do not eat peaches, oranges, pineapples, raisins, grapes , canteloupe , honey dew and fruit medleys 
 
 
----------------------------------------------------------- 
 
 
START ON  q-symia  7.5 -46  Mg once a day  - [facility]

## 2020-09-03 NOTE — PROGRESS NOTES
HISTORY OF PRESENT ILLNESS   [place] is a 38 y.o. female. HPI   F/u for glucose intolerance, HYPOTHYRODISM ,  HTN  , obesity  ,  Pcos after  March 2020      Gained  4 lbs   Here for labs   Not eaten   She is taken off spironolactone by pcp         Old history :    Pt is diagnosed with sleep apnea   Wearing c-pap since April 2019   Gained 5 lb   COMPLIANT WITH UNITHROID            Review of Systems   Constitutional: Negative. HENT: Negative. Eyes: Negative for pain and redness. Respiratory: Negative. Cardiovascular: Negative for chest pain, palpitations and leg swelling. Gastrointestinal: Negative. Negative for constipation. Genitourinary: Negative. Musculoskeletal: Negative for myalgias. Skin: Negative. Neurological: Negative. Endo/Heme/Allergies: Negative. Psychiatric/Behavioral: Negative for depression and memory loss. The patient does not have insomnia. Physical Exam   Constitutional: She is oriented to person, place, and time. She appears well-developed and well-nourished. HENT:   Head: Normocephalic. Eyes: Conjunctivae and extraocular motions are normal. Pupils are equal, round, and reactive to light. Neck: Normal range of motion. Neck supple. No JVD present. No tracheal deviation present. No thyromegaly present. Cardiovascular: Normal rate, regular rhythm and normal heart sounds. No murmur heard. Pulmonary/Chest: Breath sounds normal.   Abdominal: Soft. Bowel sounds are normal.   Musculoskeletal: Normal range of motion. Lymphadenopathy:   She has no cervical adenopathy. Neurological: She is alert and oriented to person, place, and time. She has normal reflexes. Skin: Skin is warm. Psychiatric: She has a normal mood and affect. Labs reviewed from pcp  Jan 2020           ASSESSMENT and PLAN       1. HYPOTHYROIDISM  :   EUTHYROID ON UNITHROID 75 MCG A DAY        2.  PCOS / PREDIABETES  : A1C   Is   6.1 %   From jan 2020  Compared to       5.8 % FROM July 2018  COMPARED TO   6.1 %     From    toda y, April 2018 ,         Compared to     5.9 %     From   May 2017      5.8 %    From July 2016 labs ;    5.8 %    From    Oct 2015  Compared to   5.2 %    From    July 2015 compared to  5.9 %  Again from  March 2015     Continue on  metformin 500 mg ( 2 pills- 1 gm) bid   She is not hopeful of getting  pregnant again. ( got  )  Dietary compliance she has to maintained    . 3. HTN :  Not   Well controlled, on labetalol 200 mg tid, and atacand  32 -12.5 AND ( SHE IS off   SPIRNOLACTONE )  Infertility, secondary- s/p tubal adhesionolysis           4. Obesity S/p LAP BANDING 2006  : belviq did nto help her , made it worse   Body mass index is 48.92 kg/m². Tamra Art got covered , but  not using it as she had some bruises   She never tried  q-SYMIA per my notes , but she felt she tried it   She definitely has several comorbid states to be on weight loss meds   Will try  q-symia     5. Vit d def - otc supplements       6.  Irregular cycles - ON OCP       > 50 % of time is spent on counseling   Patient voiced understanding her plan of care       Labs today     F/u in 6 months

## 2020-09-03 NOTE — LETTER
9/3/20 Patient: Aubree Beck YOB: 1975 Date of Visit: 9/3/2020 Huang Pandya MD 
[address] [facility] [address] [place] [address] VIA Facsimile: [phone] Dear Huang Pandya MD, Thank you for referring Ms. Erika Victor to [address] [place] [address] for evaluation. My notes for this consultation are attached. If you have questions, please do not hesitate to call me. I look forward to following your patient along with you. Sincerely, Juanita Wilson MD

## 2020-09-04 LAB
ALBUMIN SERPL-MCNC: 4 G/DL (ref 3.8–4.8)
ALBUMIN/GLOB SERPL: 1.3 {RATIO} (ref 1.2–2.2)
ALP SERPL-CCNC: 57 IU/L (ref 39–117)
ALT SERPL-CCNC: 13 IU/L (ref 0–32)
AST SERPL-CCNC: 16 IU/L (ref 0–40)
BILIRUB SERPL-MCNC: 0.3 MG/DL (ref 0–1.2)
BUN SERPL-MCNC: 10 MG/DL (ref 6–24)
BUN/CREAT SERPL: 15 (ref 9–23)
CALCIUM SERPL-MCNC: 9.3 MG/DL (ref 8.7–10.2)
CHLORIDE SERPL-SCNC: 103 MMOL/L (ref 96–106)
CHOLEST SERPL-MCNC: 196 MG/DL (ref 100–199)
CO2 SERPL-SCNC: 17 MMOL/L (ref 20–29)
CREAT SERPL-MCNC: 0.66 MG/DL (ref 0.57–1)
EST. AVERAGE GLUCOSE BLD GHB EST-MCNC: 111 MG/DL
GLOBULIN SER CALC-MCNC: 3.1 G/DL (ref 1.5–4.5)
GLUCOSE SERPL-MCNC: 110 MG/DL (ref 65–99)
HBA1C MFR BLD: 5.5 % (ref 4.8–5.6)
HDLC SERPL-MCNC: 75 MG/DL
INTERPRETATION, 910389: NORMAL
LDLC SERPL CALC-MCNC: 90 MG/DL (ref 0–99)
POTASSIUM SERPL-SCNC: 4.2 MMOL/L (ref 3.5–5.2)
PROT SERPL-MCNC: 7.1 G/DL (ref 6–8.5)
SODIUM SERPL-SCNC: 141 MMOL/L (ref 134–144)
T4 FREE SERPL-MCNC: 1.24 NG/DL (ref 0.82–1.77)
TRIGL SERPL-MCNC: 183 MG/DL (ref 0–149)
TSH SERPL DL<=0.005 MIU/L-ACNC: 3.99 UIU/ML (ref 0.45–4.5)
VLDLC SERPL CALC-MCNC: 31 MG/DL (ref 5–40)

## 2020-12-19 RX ORDER — SPIRONOLACTONE 25 MG/1
TABLET ORAL
Qty: 180 TAB | Refills: 1 | Status: SHIPPED | OUTPATIENT
Start: 2020-12-19 | End: 2022-01-03 | Stop reason: ALTCHOICE

## 2021-03-03 ENCOUNTER — OFFICE VISIT (OUTPATIENT)
Dept: ENDOCRINOLOGY | Age: 46
End: 2021-03-03
Payer: MEDICAID

## 2021-03-03 VITALS
WEIGHT: 293 LBS | HEART RATE: 83 BPM | BODY MASS INDEX: 48.82 KG/M2 | OXYGEN SATURATION: 100 % | HEIGHT: 65 IN | TEMPERATURE: 98 F | SYSTOLIC BLOOD PRESSURE: 145 MMHG | DIASTOLIC BLOOD PRESSURE: 93 MMHG | RESPIRATION RATE: 20 BRPM

## 2021-03-03 DIAGNOSIS — I10 ESSENTIAL HYPERTENSION: ICD-10-CM

## 2021-03-03 DIAGNOSIS — E28.2 PCOS (POLYCYSTIC OVARIAN SYNDROME): ICD-10-CM

## 2021-03-03 DIAGNOSIS — E03.9 HYPOTHYROIDISM, UNSPECIFIED TYPE: Primary | ICD-10-CM

## 2021-03-03 PROCEDURE — 99214 OFFICE O/P EST MOD 30 MIN: CPT | Performed by: INTERNAL MEDICINE

## 2021-03-03 RX ORDER — SEMAGLUTIDE 1.34 MG/ML
INJECTION, SOLUTION SUBCUTANEOUS
Qty: 3 BOX | Refills: 0 | Status: SHIPPED | COMMUNITY
Start: 2021-03-03 | End: 2022-01-03

## 2021-03-03 NOTE — PROGRESS NOTES
HISTORY OF PRESENT ILLNESS   Gwyn Guy is a 41 y.o. female. HPI   F/u for glucose intolerance, HYPOTHYRODISM ,  HTN  , obesity  ,  Pcos after  Sept 2020        She had COVID in jan 2021   She lost her job   Lost and gained weight  - 18 lbs       Sept 2020     Gained  4 lbs   Here for labs   Not eaten   She is taken off spironolactone by pcp         Old history :  Pt is diagnosed with sleep apnea   Wearing c-pap since April 2019   Gained 5 lb   COMPLIANT WITH UNITHROID        Review of Systems   Constitutional: Negative. Psychiatric/Behavioral: Negative for depression and memory loss. The patient does not have insomnia. Physical Exam   Constitutional: She is oriented to person, place, and time. She appears well-developed and well-nourished. Psychiatric: She has a normal mood and affect. No labs     ASSESSMENT and PLAN       1. HYPOTHYROIDISM  :    ON UNITHROID 75 MCG A DAY -   Suggesting to get only TSH   Suggested use of unithroid        2. PCOS / PREDIABETES  : A1C   Is    ??    6.1 %   From jan 2020  Compared to       5.8 %    FROM July 2018  COMPARED TO   6.1 %     From    toda y, April 2018 ,         Compared to     5.9 %     From   May 2017      5.8 %    From July 2016 labs ;    5.8 %    From    Oct 2015  Compared to   5.2 %    From    July 2015 compared to  5.9 %  Again from  March 2015     Pt gained lot of weight   Gave her OZEMPIC  AS SAMPLE ONLY      Continue on  metformin 500 mg ( 2 pills- 1 gm) bid   She is not hopeful of getting  pregnant again. ( got  )  Dietary compliance she has to maintained    . 3. HTN :  Not   Well controlled, on labetalol 200 mg tid, and candesartan   32 -12.5  Infertility, secondary- s/p tubal adhesionolysis           4. Obesity S/p LAP BANDING 2006  : belviq did nto help her , made it worse   Body mass index is 51.95 kg/m².   Rylie Rather got covered , but  not using it as she had some bruises   She never tried  q-SYMIA per my notes , but she felt she tried it   She definitely has several comorbid states to be on weight loss meds   She did not use   q-symia but cannot take it anymore,  As she lost her job           5. Vit d def - otc supplements       6.  Irregular cycles - ON OCP         Reviewed results with patient and discussed the labs being ordered today/bnv  Patient voiced understanding of plan of care

## 2021-03-03 NOTE — PATIENT INSTRUCTIONS
Sample of   ( only as sample )  
 
 
unithroid 75 mcg  A day, on empty stomach with water only, no other meds or food or drinks   For next half hour  
Take any kind of vitamins, calcium, iron   Pills  4 hours later   
 
 
------------------------------------------------------------------------------------- 
 
Stay on candesartan - hctz   32  Mg   -25 one pill a day  
Stay on labetalol 200 mg 3 times a day  
 
Metformin 500 mg 2 pills twice a day with meals  
 
=--------------------------------------------------------------------- 
 
DRINK water  
Do not skip meals 
Do not eat in between meals 
 
Reduce carbs- pasta, rice, potatoes, bread  
Do not drink juices or sodas, even they are calorie zero or diet drinks 
Do not eat peanut butter  
 
Do not eat sugar free cookies and cakes  
Do not eat peaches, oranges, pineapples, raisins, grapes , canteloupe , honey dew and fruit medleys

## 2021-03-03 NOTE — PROGRESS NOTES
1. Have you been to the ER, urgent care clinic since your last visit? January 2021/[facility]  Hospitalized since your last visit? No    2. Have you seen or consulted any other health care providers outside of the [address] [place] [address] since your last visit? Include any pap smears or colon screening.  No    Wt Readings from Last 3 Encounters:   03/03/21 312 lb 3.2 oz (141.6 kg)   09/03/20 294 lb (133.4 kg)   03/02/20 293 lb 11.2 oz (133.2 kg)     Temp Readings from Last 3 Encounters:   03/03/21 98 °F (36.7 °C) (Oral)   09/03/20 98.8 °F (37.1 °C) (Oral)   03/02/20 98.6 °F (37 °C) (Oral)     BP Readings from Last 3 Encounters:   03/03/21 (!) 145/93   09/03/20 138/85   03/02/20 (!) 148/98     Pulse Readings from Last 3 Encounters:   03/03/21 83   09/03/20 77   03/02/20 75

## 2021-03-03 NOTE — LETTER
3/3/2021 Patient: Martín Frye YOB: 1975 Date of Visit: 3/3/2021 [facility]jor[facility] Raygoza MD [facility] [address] [facility] [address] [facility] [address] Via Fax: [phone] Dear Marjorie Raygoza MD, Thank you for referring Ms. Simran Campbell to [address] [place] [address] for evaluation. My notes for this consultation are attached. If you have questions, please do not hesitate to call me. I look forward to following your patient along with you. Sincerely, Zeny Hardy MD

## 2021-04-23 LAB — HBA1C MFR BLD HPLC: 5.9 %

## 2021-05-12 ENCOUNTER — PATIENT MESSAGE (OUTPATIENT)
Dept: ENDOCRINOLOGY | Age: 46
End: 2021-05-12

## 2021-05-12 DIAGNOSIS — R73.03 PREDIABETES: Primary | ICD-10-CM

## 2021-05-12 DIAGNOSIS — E66.01 CLASS 3 SEVERE OBESITY DUE TO EXCESS CALORIES WITH SERIOUS COMORBIDITY AND BODY MASS INDEX (BMI) OF 45.0 TO 49.9 IN ADULT (HCC): ICD-10-CM

## 2021-05-13 PROBLEM — G47.30 SLEEP APNEA WITH USE OF CONTINUOUS POSITIVE AIRWAY PRESSURE (CPAP): Status: ACTIVE | Noted: 2021-05-13

## 2021-05-13 PROBLEM — F32.0 MILD MAJOR DEPRESSION, SINGLE EPISODE (HCC): Status: ACTIVE | Noted: 2021-05-13

## 2021-05-13 PROBLEM — J30.2 ACUTE SEASONAL ALLERGIC RHINITIS: Status: ACTIVE | Noted: 2018-04-02

## 2021-05-13 PROBLEM — M17.9 OSTEOARTHRITIS OF KNEE: Status: ACTIVE | Noted: 2021-05-13

## 2021-05-13 PROBLEM — A60.00 GENITAL HERPES SIMPLEX: Status: ACTIVE | Noted: 2021-05-13

## 2021-05-13 PROBLEM — R73.01 IMPAIRED FASTING GLUCOSE: Status: ACTIVE | Noted: 2021-05-13

## 2021-05-13 RX ORDER — PEN NEEDLE, DIABETIC 31 GX3/16"
NEEDLE, DISPOSABLE MISCELLANEOUS
Status: CANCELLED | OUTPATIENT
Start: 2021-05-13

## 2021-05-13 RX ORDER — SEMAGLUTIDE 1.34 MG/ML
0.5 INJECTION, SOLUTION SUBCUTANEOUS
Qty: 1 BOX | Refills: 5 | Status: SHIPPED | OUTPATIENT
Start: 2021-05-13 | End: 2022-01-03

## 2021-05-13 NOTE — TELEPHONE ENCOUNTER
I saw her labs and I said in one of the messages that they looked fine.       I will sign the prescription for it and will see if it gets covered

## 2021-05-13 NOTE — TELEPHONE ENCOUNTER
PCP: Lillian Bonilla MD    Last appt: 3/3/2021  Future Appointments   Date Time Provider Crystal Reyna   9/3/2021  1:00 PM Darian Tong MD CDE [facility]       Requested Prescriptions     Pending Prescriptions Disp Refills    semaglutide (Ozempic) 0.25 mg/0.2 mL (2 mg/1.5 mL) sub-q pen 1 Box      Sig: by SubCUTAneous route every seven (7) days.  Insulin Needles, Disposable, 32 gauge x 5/32\" ndle        Sig: by Does Not Apply route. No visits with results within 3 Month(s) from this visit. Latest known visit with results is:   Office Visit on 09/03/2020   Component Date Value Ref Range Status    TSH 09/03/2020 2.04  0.36 - 3.74 uIU/mL Final    Comment:   Due to TSH heterogeneity, both structurally and degree of glycosylation,  monoclonal antibodies used in the TSH assay may not accurately quantitate TSH.   Therefore, this result should be correlated with clinical findings as well as  with other assessments of thyroid function, e.g., free T4, free T3.

## 2021-05-26 RX ORDER — SEMAGLUTIDE 1.34 MG/ML
INJECTION, SOLUTION SUBCUTANEOUS
Qty: 2 BOX | Refills: 0 | Status: SHIPPED | COMMUNITY
Start: 2021-05-26 | End: 2022-04-25

## 2022-01-03 ENCOUNTER — DOCUMENTATION ONLY (OUTPATIENT)
Dept: ENDOCRINOLOGY | Age: 47
End: 2022-01-03

## 2022-01-03 ENCOUNTER — VIRTUAL VISIT (OUTPATIENT)
Dept: ENDOCRINOLOGY | Age: 47
End: 2022-01-03
Payer: COMMERCIAL

## 2022-01-03 DIAGNOSIS — E66.01 CLASS 3 SEVERE OBESITY DUE TO EXCESS CALORIES WITH SERIOUS COMORBIDITY AND BODY MASS INDEX (BMI) OF 45.0 TO 49.9 IN ADULT (HCC): ICD-10-CM

## 2022-01-03 DIAGNOSIS — R73.03 PREDIABETES: ICD-10-CM

## 2022-01-03 DIAGNOSIS — E03.9 HYPOTHYROIDISM, UNSPECIFIED TYPE: Primary | ICD-10-CM

## 2022-01-03 DIAGNOSIS — E28.2 PCOS (POLYCYSTIC OVARIAN SYNDROME): ICD-10-CM

## 2022-01-03 PROCEDURE — 99214 OFFICE O/P EST MOD 30 MIN: CPT | Performed by: INTERNAL MEDICINE

## 2022-01-03 RX ORDER — LEVOTHYROXINE SODIUM 75 UG/1
TABLET ORAL
Qty: 90 TABLET | Refills: 3 | Status: SHIPPED | OUTPATIENT
Start: 2022-01-03 | End: 2022-06-01 | Stop reason: SDUPTHER

## 2022-01-03 RX ORDER — PHENTERMINE AND TOPIRAMATE 7.5; 46 MG/1; MG/1
CAPSULE, EXTENDED RELEASE ORAL
Qty: 30 CAPSULE | Refills: 4 | Status: SHIPPED
Start: 2022-01-03 | End: 2022-06-01

## 2022-01-03 RX ORDER — DULAGLUTIDE 0.75 MG/.5ML
0.75 INJECTION, SOLUTION SUBCUTANEOUS
Qty: 2 ML | Refills: 6 | Status: CANCELLED | OUTPATIENT
Start: 2022-01-03

## 2022-01-03 RX ORDER — DULAGLUTIDE 1.5 MG/.5ML
1.5 INJECTION, SOLUTION SUBCUTANEOUS
Qty: 2 ML | Refills: 6 | Status: SHIPPED | OUTPATIENT
Start: 2022-01-03 | End: 2022-04-25 | Stop reason: DRUGHIGH

## 2022-01-03 RX ORDER — CANDESARTAN CILEXETIL AND HYDROCHLOROTHIAZIDE 32; 25 MG/1; MG/1
TABLET ORAL
Qty: 90 TABLET | Refills: 4 | Status: SHIPPED | OUTPATIENT
Start: 2022-01-03 | End: 2022-01-19 | Stop reason: SDUPTHER

## 2022-01-03 NOTE — PATIENT INSTRUCTIONS
SPECIFIC INSTRUCTIONS BELOW       Plan A : trulicity weekly     Plan B : saxenda       ------------------------------------------------------------------    Q symia - 7.5 - 46 one pill a day  ( medvantx) for 2 months     Call in a month to increase the  Next dose             Unithroid 75 mcg  A day, on empty stomach with water only, no other meds or food or drinks   For next half hour       Take any kind of vitamins, calcium, iron   Pills  4 hours later                  -------------PAY ATTENTION TO THESE GENERAL INSTRUCTIONS -----------------      - The medications prescribed at this visit will not be available at pharmacy until 6 pm       - YOUR MED LIST IS NOT UP TO DATE AS SOME CHANGES ARE BEING MADE AFTER THE VISIT - FOLLOW SPECIFIC INSTRUCTIONS  ABOVE     -ANY tests other than blood work, which you opt to do  outside the  [facility] imaging facilities, you are responsible for prior authorizations if  required    - 18 Vanessa Patrick UP TO DATE ON YOUR AVS- PLEASE IGNORE     Results     *Normal results will not be notified by a phone call starting January 1 2021   *If you have an upcoming visit, the results will be discussed at the visit   *Please sign up for MY CHART if you want access to your lab and test results  *Abnormal results which require immediate attention will be notified by phone call   *Abnormal results which do not require immediate assistance will be notified in 1-2 weeks       Refills    -    have your pharmacy send us a refill request . Refills are done max for one year and a visit is a must before refills are extended    Follow up appointments -  highly encourage you to make it when you are checking out. We can accommodate you into the schedule based on your clinical situation, but not for extending refills beyond a year. Labs are important to give refills and is important to get labs before the visit     Phone calls  -  Allow  24 hrs.  for non-urgent calls to be returned  Prior authorization - It may take 2-4 weeks to process  Forms  -  FMLA, DMV etc., will take up to 2 weeks to process  Cancellations - please notify the office 2 days in advance   Samples  - will only be dispensed at visits       If not showing for the appointments and cancelling appointments within 24 hours are kept track of and three  of such situations in  two consecutive years will likely be considered for termination from the practice    -------------------------------------------------------------------------------------------------------------------

## 2022-01-03 NOTE — PROGRESS NOTES
**THIS IS A VIRTUAL VISIT VIA AUDIO- VIDEO SYNCHRONOUS DISCUSSION. PATIENT AGREED TO HAVE THEIR CARE DELIVERED OVER A [employer] ME VIDEO VISIT IN PLACE OF THEIR REGULARLY SCHEDULED OFFICE VISIT**   Pt  is aware that this is a billable encounter and is responsible for copays/deductibles   Patient gave a verbal consent to proceed with virtual video visit   Patient is at home and I, the provider,  am at the office care diabetes and endocrinology        HISTORY OF PRESENT ILLNESS   Mohinder Rao is a 40 y.o. female. HPI   F/u for glucose intolerance, HYPOTHYRODISM ,  HTN  , obesity  ,  Pcos after  March 2021       C/o weight gain   C/o diet non compliance     Compliant with unithroid , missed labs   No more samples of ozempic left           March 2021        She had COVID in jan 2021   She lost her job   Lost and gained weight  - 18 lbs       Sept 2020     Gained  4 lbs   Here for labs   Not eaten   She is taken off spironolactone by pcp         Old history :  Pt is diagnosed with sleep apnea   Wearing c-pap since April 2019   Gained 5 lb   COMPLIANT WITH UNITHROID        Review of Systems   Constitutional: Negative. Psychiatric/Behavioral: Negative for depression and memory loss. The patient does not have insomnia. Physical Exam   Constitutional: She is oriented to person, place, and time. She appears well-developed and well-nourished. Psychiatric: She has a normal mood and affect. NO LABS         ASSESSMENT and PLAN       1. HYPOTHYROIDISM  :    ON UNITHROID 75 MCG A DAY -          2. PCOS / PREDIABETES  : A1C   Is   ?  Compared to   5.9 %  From nov 2021  Compared to    6.1 %   From jan 2020  Compared to       5.8 %    FROM July 2018  COMPARED TO   6.1 %     From    toda y, April 2018 ,         Compared to     5.9 %     From   May 2017            Jan 2022  - emphasized on TLC- maintain food log   - start on Q symia    - as BMI is over 50, will get trulicity  And or saxenda   She is unable to lose weight with TLC  She is hypertensive, and has PCOS            March 2021      Pt gained lot of weight   Gave her OZEMPIC  AS SAMPLE ONLY      Continue on  metformin 500 mg ( 2 pills- 1 gm) bid   She is not hopeful of getting  pregnant again. ( got  )  Dietary compliance she has to maintained    . 3. HTN :  Not   Well controlled, on labetalol 200 mg tid, and candesartan   32 -12.5  Infertility, secondary- s/p tubal adhesionolysis           4. Obesity S/p LAP BANDING 2006  : belviq did nto help her , made it worse   There is no height or weight on file to calculate BMI. Gertrudis Gutiérrez got covered , but  not using it as she had some bruises   She never tried  q-SYMIA per my notes , but she felt she tried it   She definitely has several comorbid states to be on weight loss meds   She did not use   q-symia but cannot take it anymore,  As she lost her job           5. Vit d def - otc supplements       6. Irregular cycles - ON OCP   Educated pt the risk for blood clots         Pursuant  To the Emergency declaration under the [address] and the [facility], [address] [place] [address] authority and the [facility], to reduce the patient's risk of exposure to  COVID-19 and provide continuity of care for an established patient.     Reviewed results with patient and discussed the labs being ordered today/bnv  Patient voiced understanding of plan of care

## 2022-01-06 LAB
ALBUMIN SERPL-MCNC: 4.2 G/DL (ref 3.8–4.8)
ALBUMIN/CREAT UR: 5 MG/G CREAT (ref 0–29)
ALBUMIN/GLOB SERPL: 1.3 {RATIO} (ref 1.2–2.2)
ALP SERPL-CCNC: 69 IU/L (ref 44–121)
ALT SERPL-CCNC: 25 IU/L (ref 0–32)
AST SERPL-CCNC: 23 IU/L (ref 0–40)
BILIRUB SERPL-MCNC: <0.2 MG/DL (ref 0–1.2)
BUN SERPL-MCNC: 11 MG/DL (ref 6–24)
BUN/CREAT SERPL: 15 (ref 9–23)
CALCIUM SERPL-MCNC: 9.1 MG/DL (ref 8.7–10.2)
CHLORIDE SERPL-SCNC: 106 MMOL/L (ref 96–106)
CHOLEST SERPL-MCNC: 203 MG/DL (ref 100–199)
CO2 SERPL-SCNC: 19 MMOL/L (ref 20–29)
CREAT SERPL-MCNC: 0.74 MG/DL (ref 0.57–1)
CREAT UR-MCNC: 126.8 MG/DL
EST. AVERAGE GLUCOSE BLD GHB EST-MCNC: 131 MG/DL
GLOBULIN SER CALC-MCNC: 3.2 G/DL (ref 1.5–4.5)
GLUCOSE SERPL-MCNC: 115 MG/DL (ref 65–99)
HBA1C MFR BLD: 6.2 % (ref 4.8–5.6)
HDLC SERPL-MCNC: 79 MG/DL
IMP & REVIEW OF LAB RESULTS: NORMAL
LDLC SERPL CALC-MCNC: 97 MG/DL (ref 0–99)
MICROALBUMIN UR-MCNC: 5.8 UG/ML
POTASSIUM SERPL-SCNC: 4.2 MMOL/L (ref 3.5–5.2)
PROT SERPL-MCNC: 7.4 G/DL (ref 6–8.5)
SODIUM SERPL-SCNC: 140 MMOL/L (ref 134–144)
TRIGL SERPL-MCNC: 161 MG/DL (ref 0–149)
TSH SERPL DL<=0.005 MIU/L-ACNC: 4.27 UIU/ML (ref 0.45–4.5)
VLDLC SERPL CALC-MCNC: 27 MG/DL (ref 5–40)

## 2022-01-19 DIAGNOSIS — R73.03 PREDIABETES: ICD-10-CM

## 2022-01-19 DIAGNOSIS — I10 ESSENTIAL HYPERTENSION: Primary | ICD-10-CM

## 2022-01-19 NOTE — TELEPHONE ENCOUNTER
Requested Prescriptions     Pending Prescriptions Disp Refills    labetaloL (NORMODYNE) 200 mg tablet 270 Tablet 4     Sig: Take 1 Tablet by mouth three (3) times daily.     candesartan-hydroCHLOROthiazide (ATACAND HCT) 32-25 mg tab per tablet 90 Tablet 4     Sig: ONCE A DAY    Blood-Glucose Meter (OneTouch Verio Meter) misc 1 Each 0     Sig: Use to check blood glucose level twice daily DX E11.65    glucose blood VI test strips (OneTouch Verio test strips) strip 100 Strip 5     Sig: Use to check blood glucose level twice daily DX E11.65

## 2022-01-20 RX ORDER — BLOOD SUGAR DIAGNOSTIC
STRIP MISCELLANEOUS
Qty: 100 STRIP | Refills: 5 | Status: SHIPPED | OUTPATIENT
Start: 2022-01-20

## 2022-01-20 RX ORDER — BLOOD-GLUCOSE METER
EACH MISCELLANEOUS
Qty: 1 EACH | Refills: 0 | Status: SHIPPED | OUTPATIENT
Start: 2022-01-20

## 2022-01-20 RX ORDER — LABETALOL 200 MG/1
200 TABLET, FILM COATED ORAL 3 TIMES DAILY
Qty: 270 TABLET | Refills: 4 | Status: SHIPPED | OUTPATIENT
Start: 2022-01-20 | End: 2022-06-01 | Stop reason: SDUPTHER

## 2022-01-20 RX ORDER — CANDESARTAN CILEXETIL AND HYDROCHLOROTHIAZIDE 32; 25 MG/1; MG/1
TABLET ORAL
Qty: 90 TABLET | Refills: 4 | Status: SHIPPED | OUTPATIENT
Start: 2022-01-20 | End: 2022-01-25 | Stop reason: CLARIF

## 2022-01-25 DIAGNOSIS — I10 ESSENTIAL HYPERTENSION: Primary | ICD-10-CM

## 2022-01-25 RX ORDER — OLMESARTAN MEDOXOMIL AND HYDROCHLOROTHIAZIDE 40/25 40; 25 MG/1; MG/1
1 TABLET ORAL DAILY
Qty: 90 TABLET | Refills: 1 | Status: SHIPPED | OUTPATIENT
Start: 2022-01-25 | End: 2022-06-01 | Stop reason: SDUPTHER

## 2022-01-25 NOTE — TELEPHONE ENCOUNTER
Requested Prescriptions     Pending Prescriptions Disp Refills    olmesartan-hydroCHLOROthiazide (BENICAR HCT) 40-25 mg per tablet 90 Tablet 1     Sig: Take 1 Tablet by mouth daily.      Per Dr Don Castañeda

## 2022-03-18 PROBLEM — G47.30 SLEEP APNEA WITH USE OF CONTINUOUS POSITIVE AIRWAY PRESSURE (CPAP): Status: ACTIVE | Noted: 2021-05-13

## 2022-03-19 PROBLEM — J30.2 ACUTE SEASONAL ALLERGIC RHINITIS: Status: ACTIVE | Noted: 2018-04-02

## 2022-03-19 PROBLEM — A60.00 GENITAL HERPES SIMPLEX: Status: ACTIVE | Noted: 2021-05-13

## 2022-03-19 PROBLEM — F32.0 MILD MAJOR DEPRESSION, SINGLE EPISODE (HCC): Status: ACTIVE | Noted: 2021-05-13

## 2022-03-19 PROBLEM — R73.01 IMPAIRED FASTING GLUCOSE: Status: ACTIVE | Noted: 2021-05-13

## 2022-03-20 PROBLEM — M17.9 OSTEOARTHRITIS OF KNEE: Status: ACTIVE | Noted: 2021-05-13

## 2022-04-25 RX ORDER — DULAGLUTIDE 3 MG/.5ML
3 INJECTION, SOLUTION SUBCUTANEOUS
Qty: 12 EACH | Refills: 3 | Status: SHIPPED | OUTPATIENT
Start: 2022-04-25 | End: 2022-05-19 | Stop reason: SDUPTHER

## 2022-05-09 ENCOUNTER — TELEPHONE (OUTPATIENT)
Dept: ENDOCRINOLOGY | Age: 47
End: 2022-05-09

## 2022-05-09 NOTE — TELEPHONE ENCOUNTER
Patient has questions about Trulicity.   Patient states went to  on Saturday and states they are waiting on approval.

## 2022-05-09 NOTE — TELEPHONE ENCOUNTER
Contacted patient in reference to Trulicity and advised that the medication was approved through cover my meds. Call patient pharmacy and advised of the approval as well.

## 2022-05-18 ENCOUNTER — TELEPHONE (OUTPATIENT)
Dept: ENDOCRINOLOGY | Age: 47
End: 2022-05-18

## 2022-05-18 NOTE — TELEPHONE ENCOUNTER
[facility] states the [facility]. [facility] states [facility] needs [facility], though I see one in the system. Says she is out and is going to start over with it once she receives it. Please advise pt and pharmacy.  ADELINE

## 2022-05-18 NOTE — TELEPHONE ENCOUNTER
Pharmacy will run the prescription through again. PA approval information faxed/confirmed to [facility] pharmacy as well.

## 2022-05-19 DIAGNOSIS — E28.2 PCOS (POLYCYSTIC OVARIAN SYNDROME): ICD-10-CM

## 2022-05-19 DIAGNOSIS — R73.03 PREDIABETES: ICD-10-CM

## 2022-05-19 DIAGNOSIS — E66.01 CLASS 3 SEVERE OBESITY DUE TO EXCESS CALORIES WITH SERIOUS COMORBIDITY AND BODY MASS INDEX (BMI) OF 45.0 TO 49.9 IN ADULT (HCC): Primary | ICD-10-CM

## 2022-05-19 RX ORDER — DULAGLUTIDE 3 MG/.5ML
3 INJECTION, SOLUTION SUBCUTANEOUS
Qty: 12 EACH | Refills: 3 | Status: SHIPPED | OUTPATIENT
Start: 2022-05-19

## 2022-05-19 NOTE — TELEPHONE ENCOUNTER
Patient called and advised Rosi Lang that [facility] states that they do not have her Trulicity prescription despite it's receipt being confirmed on 04/25/22. Rosi Lang advised patient will request that Dr Sandra Cifuentes send prescription again.

## 2022-05-19 NOTE — TELEPHONE ENCOUNTER
Pt states that she would like a call when we know the script is sent and received to [facility]. She is not happy as she has been trying to fill this script for a couple of days now and just gets pushed around. She was expecting a call back once we talked to [employer] yesterday 5/18/2022. Please advise once it is finalized.  ADELINE

## 2022-06-01 ENCOUNTER — OFFICE VISIT (OUTPATIENT)
Dept: ENDOCRINOLOGY | Age: 47
End: 2022-06-01
Payer: COMMERCIAL

## 2022-06-01 VITALS
OXYGEN SATURATION: 96 % | SYSTOLIC BLOOD PRESSURE: 120 MMHG | TEMPERATURE: 97.4 F | BODY MASS INDEX: 48.82 KG/M2 | RESPIRATION RATE: 18 BRPM | DIASTOLIC BLOOD PRESSURE: 79 MMHG | WEIGHT: 293 LBS | HEIGHT: 65 IN | HEART RATE: 85 BPM

## 2022-06-01 DIAGNOSIS — I10 ESSENTIAL HYPERTENSION: ICD-10-CM

## 2022-06-01 DIAGNOSIS — E03.9 HYPOTHYROIDISM, UNSPECIFIED TYPE: Primary | ICD-10-CM

## 2022-06-01 DIAGNOSIS — E55.9 VITAMIN D DEFICIENCY: ICD-10-CM

## 2022-06-01 DIAGNOSIS — R73.03 PREDIABETES: ICD-10-CM

## 2022-06-01 DIAGNOSIS — E28.2 PCOS (POLYCYSTIC OVARIAN SYNDROME): ICD-10-CM

## 2022-06-01 LAB
25(OH)D3 SERPL-MCNC: 28.9 NG/ML (ref 30–100)
ALBUMIN SERPL-MCNC: 4 G/DL (ref 3.5–5)
ALBUMIN/GLOB SERPL: 1.1 {RATIO} (ref 1.1–2.2)
ALP SERPL-CCNC: 66 U/L (ref 45–117)
ALT SERPL-CCNC: 41 U/L (ref 12–78)
ANION GAP SERPL CALC-SCNC: 9 MMOL/L (ref 5–15)
AST SERPL-CCNC: 30 U/L (ref 15–37)
BILIRUB SERPL-MCNC: 0.3 MG/DL (ref 0.2–1)
BUN SERPL-MCNC: 18 MG/DL (ref 6–20)
BUN/CREAT SERPL: 21 (ref 12–20)
CALCIUM SERPL-MCNC: 9.7 MG/DL (ref 8.5–10.1)
CHLORIDE SERPL-SCNC: 102 MMOL/L (ref 97–108)
CHOLEST SERPL-MCNC: 182 MG/DL
CO2 SERPL-SCNC: 28 MMOL/L (ref 21–32)
CREAT SERPL-MCNC: 0.87 MG/DL (ref 0.55–1.02)
EST. AVERAGE GLUCOSE BLD GHB EST-MCNC: 117 MG/DL
GLOBULIN SER CALC-MCNC: 3.7 G/DL (ref 2–4)
GLUCOSE SERPL-MCNC: 100 MG/DL (ref 65–100)
HBA1C MFR BLD: 5.7 % (ref 4–5.6)
HDLC SERPL-MCNC: 50 MG/DL
HDLC SERPL: 3.6 {RATIO} (ref 0–5)
LDLC SERPL CALC-MCNC: 95.6 MG/DL (ref 0–100)
POTASSIUM SERPL-SCNC: 3.9 MMOL/L (ref 3.5–5.1)
PROT SERPL-MCNC: 7.7 G/DL (ref 6.4–8.2)
SODIUM SERPL-SCNC: 139 MMOL/L (ref 136–145)
T4 FREE SERPL-MCNC: 0.9 NG/DL (ref 0.8–1.5)
TRIGL SERPL-MCNC: 182 MG/DL (ref ?–150)
TSH SERPL DL<=0.05 MIU/L-ACNC: 2.62 UIU/ML (ref 0.36–3.74)
VLDLC SERPL CALC-MCNC: 36.4 MG/DL

## 2022-06-01 PROCEDURE — 99214 OFFICE O/P EST MOD 30 MIN: CPT | Performed by: INTERNAL MEDICINE

## 2022-06-01 RX ORDER — METFORMIN HYDROCHLORIDE 500 MG/1
TABLET ORAL
Qty: 360 TABLET | Refills: 4 | Status: SHIPPED | OUTPATIENT
Start: 2022-06-01

## 2022-06-01 RX ORDER — LABETALOL 200 MG/1
200 TABLET, FILM COATED ORAL 3 TIMES DAILY
Qty: 270 TABLET | Refills: 4 | Status: SHIPPED | OUTPATIENT
Start: 2022-06-01

## 2022-06-01 RX ORDER — LEVOTHYROXINE SODIUM 75 UG/1
TABLET ORAL
Qty: 90 TABLET | Refills: 3 | Status: SHIPPED | OUTPATIENT
Start: 2022-06-01

## 2022-06-01 RX ORDER — OLMESARTAN MEDOXOMIL AND HYDROCHLOROTHIAZIDE 40/25 40; 25 MG/1; MG/1
1 TABLET ORAL DAILY
Qty: 90 TABLET | Refills: 1 | Status: SHIPPED | OUTPATIENT
Start: 2022-06-01

## 2022-06-01 NOTE — PROGRESS NOTES
Room 1    Identified pt with two pt identifiers(name and ). Reviewed record in preparation for visit and have obtained necessary documentation. All patient medications has been reviewed. Chief Complaint   Patient presents with    Thyroid Problem    PCOS       3 most recent PHQ Screens 3/3/2021   PHQ Not Done -   Little interest or pleasure in doing things Not at all   Feeling down, depressed, irritable, or hopeless Not at all   Total Score PHQ 2 0         Health Maintenance Review: Patient reminded of \"due or due soon\" health maintenance. I have asked the patient to contact his/her primary care provider (PCP) for follow-up on his/her health maintenance. Vitals:    22 0846   BP: 120/79   Pulse: 85   Resp: 18   Temp: 97.4 °F (36.3 °C)   TempSrc: Temporal   SpO2: 96%   Weight: 295 lb 3.2 oz (133.9 kg)   Height: 5' 5\" (1.651 m)   PainSc:   0 - No pain         Lab Results   Component Value Date/Time    Hemoglobin A1c 6.2 (H) 2022 10:10 AM    Hemoglobin A1c (POC) 5.8 2019 05:02 PM    Hemoglobin A1c, External 5.9 2021 12:00 AM       Coordination of Care Questionnaire:   1) Have you been to an emergency room, urgent care, or hospitalized since your last visit?   no       2. Have seen or consulted any other health care provider since your last visit? NO      Patient is accompanied by self I have received verbal consent from Henry Alves to discuss any/all medical information while they are present in the room.

## 2022-06-01 NOTE — PATIENT INSTRUCTIONS
SPECIFIC INSTRUCTIONS BELOW     Metformin  500 mg twice a day with meals     trulicity 3 mg a week       Unithroid 75 mcg  A day, on empty stomach with water only, no other meds or food or drinks   For next half hour   Take any kind of vitamins, calcium, iron   Pills  4 hours later        -------------PAY ATTENTION TO THESE GENERAL INSTRUCTIONS -----------------      - The medications prescribed at this visit will not be available at pharmacy until 6 pm       - YOUR MED LIST IS NOT UP TO DATE AS SOME CHANGES ARE BEING MADE AFTER THE VISIT - FOLLOW SPECIFIC INSTRUCTIONS  ABOVE     -ANY tests other than blood work, which you opt to do  outside the  [facility] facilities, you are responsible for prior authorizations if  required    - 18 Vanesas Patrick UP TO DATE ON YOUR AVS- PLEASE IGNORE     Results     *Normal results will not be notified by a phone call starting January 1 2021   *If you have an upcoming visit, the results will be discussed at the visit   *Please sign up for MY CHART if you want access to your lab and test results  *Abnormal results which require immediate attention will be notified by phone call   *Abnormal results which do not require immediate assistance will be notified in 1-2 weeks       Refills    -    have your pharmacy send us a refill request . Refills are done max for one year and a visit is a must before refills are extended    Follow up appointments -  highly encourage you to make it when you are checking out. We can accommodate you into the schedule based on your clinical situation, but not for extending refills beyond a year. Labs are important to give refills and is important to get labs before the visit     Phone calls  -  Allow  24 hrs.  for non-urgent calls to be returned  Prior authorization - It may take 2-4 weeks to process  Forms  -  FMLA, DMV etc., will take up to 2 weeks to process  Cancellations - please notify the office 2 days in advance Samples  - will only be dispensed at visits       If not showing for the appointments and cancelling appointments within 24 hours are kept track of and three  of such situations in  two consecutive years will likely be considered for termination from the practice    -------------------------------------------------------------------------------------------------------------------

## 2022-06-01 NOTE — PROGRESS NOTES
HISTORY OF PRESENT ILLNESS   Johnathon Caller is a 40 y.o. female. HPI   F/u for glucose intolerance, HYPOTHYRODISM ,  HTN  , obesity  ,  Pcos after  Jan 2022     Lost 17 lbs   She is taking trulicity  3 mg a week        Jan 2022     C/o weight gain   C/o diet non compliance   Compliant with unithroid , missed labs   No more samples of ozempic left     March 2021        She had COVID in jan 2021   She lost her job   Lost and gained weight  - 18 lbs       Old history :  Pt is diagnosed with sleep apnea   Wearing c-pap since April 2019   Gained 5 lb   COMPLIANT WITH UNITHROID        Review of Systems   Constitutional: Negative. Psychiatric/Behavioral: Negative for depression and memory loss. The patient does not have insomnia. Physical Exam   Constitutional: She is oriented to person, place, and time. She appears well-developed and well-nourished. Psychiatric: She has a normal mood and affect. No  labs         ASSESSMENT and PLAN       1. HYPOTHYROIDISM  :    ON UNITHROID 75 MCG A DAY - ordered labs          2. PCOS / PREDIABETES  : A1C   Is   ? Compared to   5.9 %  From nov 2021  Compared to    6.1 %   From jan 2020  Compared to       5.8 %    FROM July 2018  COMPARED TO   6.1 %     From    toda y, April 2018 ,         Compared to     5.9 %     From   May 2017          May 2022   Stay on metformin 500 mg 2 pills bid   She is now taking Trulicity @ 3 mg dose   Helping her with weight loss     Emphasized on diet control       Jan 2022  - emphasized on TLC- maintain food log   - start on Q symia  - as BMI is over 50, will get trulicity  And or saxenda   She is unable to lose weight with TLC  She is hypertensive, and has PCOS          March 2021    Pt gained lot of weight   Gave her OZEMPIC  AS SAMPLE ONLY    Continue on  metformin 500 mg ( 2 pills- 1 gm) bid   She is not hopeful of getting  pregnant again. ( got  )  Dietary compliance she has to maintained    . 3.  HTN :  Well controlled, on labetalol 200 mg tid, and  benicar-hctz         4. Obesity S/p LAP BANDING 2006  : belviq did nto help her , made it worse   Body mass index is 49.12 kg/m². Cassia Galdamez got covered , but  not using it as she had some bruises   She never tried  q-SYMIA per my notes , but she felt she tried it   She definitely has several comorbid states to be on weight loss meds   She did not use   q-symia but cannot afford  anymore,  As she lost her job           5. Vit d def - otc supplements       6.  Irregular cycles - ON OCP - Infertility, secondary- s/p tubal adhesionolysis    Educated pt the risk for blood clots         Reviewed results with patient and discussed the labs being ordered today/bnv  Patient voiced understanding of plan of care

## 2022-06-01 NOTE — LETTER
6/1/2022    Patient: Jair Heart   YOB: 1975   Date of Visit: 6/1/2022     Ann Marie Nolen MD  [address] [address]  Via Fax: [phone]    Dear Ann Marie Nolen MD,      Thank you for referring Ms. Adriana Pires to [address] [place] [address] for evaluation. My notes for this consultation are attached. If you have questions, please do not hesitate to call me. I look forward to following your patient along with you.       Sincerely,    Ely Brown MD

## 2022-11-19 LAB
25(OH)D3+25(OH)D2 SERPL-MCNC: 30.8 NG/ML (ref 30–100)
ALBUMIN SERPL-MCNC: 4.3 G/DL (ref 3.8–4.8)
ALBUMIN/GLOB SERPL: 1.3 {RATIO} (ref 1.2–2.2)
ALP SERPL-CCNC: 69 IU/L (ref 44–121)
ALT SERPL-CCNC: 22 IU/L (ref 0–32)
AST SERPL-CCNC: 29 IU/L (ref 0–40)
BILIRUB SERPL-MCNC: 0.3 MG/DL (ref 0–1.2)
BUN SERPL-MCNC: 11 MG/DL (ref 6–24)
BUN/CREAT SERPL: 15 (ref 9–23)
CALCIUM SERPL-MCNC: 9.5 MG/DL (ref 8.7–10.2)
CHLORIDE SERPL-SCNC: 104 MMOL/L (ref 96–106)
CHOLEST SERPL-MCNC: 181 MG/DL (ref 100–199)
CO2 SERPL-SCNC: 21 MMOL/L (ref 20–29)
CREAT SERPL-MCNC: 0.73 MG/DL (ref 0.57–1)
EGFR: 103 ML/MIN/1.73
GLOBULIN SER CALC-MCNC: 3.3 G/DL (ref 1.5–4.5)
GLUCOSE SERPL-MCNC: 104 MG/DL (ref 70–99)
HBA1C MFR BLD: 5.9 % (ref 4.8–5.6)
HDLC SERPL-MCNC: 56 MG/DL
IMP & REVIEW OF LAB RESULTS: NORMAL
LDLC SERPL CALC-MCNC: 105 MG/DL (ref 0–99)
POTASSIUM SERPL-SCNC: 4.1 MMOL/L (ref 3.5–5.2)
PROT SERPL-MCNC: 7.6 G/DL (ref 6–8.5)
SODIUM SERPL-SCNC: 142 MMOL/L (ref 134–144)
T4 FREE SERPL-MCNC: 1.01 NG/DL (ref 0.82–1.77)
TRIGL SERPL-MCNC: 111 MG/DL (ref 0–149)
TSH SERPL DL<=0.005 MIU/L-ACNC: 4.85 UIU/ML (ref 0.45–4.5)
VLDLC SERPL CALC-MCNC: 20 MG/DL (ref 5–40)

## 2022-12-01 ENCOUNTER — VIRTUAL VISIT (OUTPATIENT)
Dept: ENDOCRINOLOGY | Age: 47
End: 2022-12-01
Payer: COMMERCIAL

## 2022-12-01 DIAGNOSIS — E28.2 PCOS (POLYCYSTIC OVARIAN SYNDROME): ICD-10-CM

## 2022-12-01 DIAGNOSIS — E03.9 HYPOTHYROIDISM, UNSPECIFIED TYPE: Primary | ICD-10-CM

## 2022-12-01 DIAGNOSIS — R73.03 PREDIABETES: ICD-10-CM

## 2022-12-01 RX ORDER — CYCLOBENZAPRINE HCL 10 MG
TABLET ORAL
COMMUNITY
Start: 2022-10-05

## 2022-12-01 RX ORDER — NAPROXEN 500 MG/1
500 TABLET ORAL 2 TIMES DAILY WITH MEALS
COMMUNITY

## 2022-12-01 RX ORDER — DROSPIRENONE 4 MG/1
1 TABLET, FILM COATED ORAL DAILY
COMMUNITY
Start: 2022-10-28

## 2022-12-01 NOTE — PROGRESS NOTES
Grecia Rick is a 56 y.o. female here for   Chief Complaint   Patient presents with    PCOS    Thyroid Problem    Hypertension       1. Have you been to the ER or an urgent care clinic since your last visit?  - no    2. Have you been hospitalized since your last visit? - no     3. Have you seen or consulted any other health care providers outside of the [address] [place] [address] since your last visit?   Include any pap smears or colon screening.- dentist Dr. Marcel Osorio, gynecologist Dr. Jake [facility]

## 2022-12-01 NOTE — PROGRESS NOTES
**THIS IS A VIRTUAL VISIT VIA AUDIO- VIDEO SYNCHRONOUS DISCUSSION. PATIENT AGREED TO HAVE THEIR CARE DELIVERED OVER A eJ[employer]/DOXY. ME VIDEO VISIT IN PLACE OF THEIR REGULARLY SCHEDULED OFFICE VISIT**   Pt  is aware that this is a billable encounter and is responsible for copays/deductibles   Patient gave a verbal consent to proceed with virtual video visit   Patient is at home and I, the provider,  am at the office care diabetes and endocrinology        HISTORY OF PRESENT ILLNESS       Ebony Ruth is a 56 y.o. female. HPI   F/u for glucose intolerance, HYPOTHYRODISM ,  HTN  , obesity - s/p lap banding  ,  Pcos after  June 2022     She is forgetting synthroid    Her son was sick  for  COVID    Could not get TRULICITY  - back order       June 2022     Lost 17 lbs   She is taking trulicity  3 mg a week        Jan 2022     C/o weight gain   C/o diet non compliance   Compliant with unithroid , missed labs   No more samples of ozempic left        Review of Systems   Constitutional: Negative. Psychiatric/Behavioral: Negative for depression and memory loss. The patient does not have insomnia. Physical Exam   Constitutional: She is oriented to person, place, and time. She appears well-developed and well-nourished. Psychiatric: She has a normal mood and affect.      Lab Results   Component Value Date/Time    Hemoglobin A1c 5.9 (H) 11/18/2022 09:14 AM    Hemoglobin A1c 5.7 (H) 06/01/2022 09:33 AM    Hemoglobin A1c 6.2 (H) 01/05/2022 10:10 AM    Hemoglobin A1c, External 5.9 04/23/2021 12:00 AM    Hemoglobin A1c, External 6.1 01/15/2020 12:00 AM    Hemoglobin A1c, External 5.8 07/29/2016 12:00 AM    Glucose 104 (H) 11/18/2022 09:14 AM    Glucose (POC) 135 (H) 03/17/2011 01:28 PM    Glucose  04/03/2018 04:22 PM    Microalb/Creat ratio (ug/mg creat.) 5 01/05/2022 10:10 AM    LDL, calculated 105 (H) 11/18/2022 09:14 AM    LDL, calculated 95.6 06/01/2022 09:33 AM    Creatinine 0.73 11/18/2022 09:14 AM Lab Results   Component Value Date/Time    Cholesterol, total 181 11/18/2022 09:14 AM    HDL Cholesterol 56 11/18/2022 09:14 AM    LDL, calculated 105 (H) 11/18/2022 09:14 AM    LDL, calculated 95.6 06/01/2022 09:33 AM    LDL-C, External 98 01/15/2020 12:00 AM    Triglyceride 111 11/18/2022 09:14 AM    CHOL/HDL Ratio 3.6 06/01/2022 09:33 AM     Lab Results   Component Value Date/Time    ALT (SGPT) 22 11/18/2022 09:14 AM    Alk. phosphatase 69 11/18/2022 09:14 AM    Bilirubin, total 0.3 11/18/2022 09:14 AM    Albumin 4.3 11/18/2022 09:14 AM    Protein, total 7.6 11/18/2022 09:14 AM    PLATELET 293 11/08/6124 12:44 PM       Lab Results   Component Value Date/Time    GFR est non-AA >60 06/01/2022 09:33 AM    GFR est AA >60 06/01/2022 09:33 AM    Creatinine 0.73 11/18/2022 09:14 AM    BUN 11 11/18/2022 09:14 AM    Sodium 142 11/18/2022 09:14 AM    Potassium 4.1 11/18/2022 09:14 AM    Chloride 104 11/18/2022 09:14 AM    CO2 21 11/18/2022 09:14 AM     Lab Results   Component Value Date/Time    TSH 4.850 (H) 11/18/2022 09:14 AM    T4, Free 1.01 11/18/2022 09:14 AM              ASSESSMENT and PLAN       1. HYPOTHYROIDISM  :    ON UNITHROID 75 MCG A DAY - Compliance emphasized        2.  PCOS / PREDIABETES  : A1C   Is   5.9 %   from  nov 2022     Compared to   5.9 %  From nov 2021  Compared to    6.1 %   From jan 2020  Compared to       5.8 %    FROM July 2018  COMPARED TO   6.1 %     From    today, April 2018 ,         Compared to     5.9 %     From   May 2017          Dec 2022     Stay on metformin 500 mg 2 pills bid   She is ON and  OFF  Trulicity @ 3 mg dose ( gave her a few samples)  Helping her with weight loss   Emphasized on diet control         May 2022   Stay on metformin 500 mg 2 pills bid   She is now taking Trulicity @ 3 mg dose   Helping her with weight loss   Emphasized on diet control       Jan 2022  - emphasized on TLC- maintain food log   - start on Q symia  - as BMI is over 50, will get trulicity  And or saxenda   She is unable to lose weight with TLC  She is hypertensive, and has PCOS      3. HTN :  Well controlled, on labetalol 200 mg tid, and  benicar-hctz         4. Obesity S/p LAP BANDING 2006  : belviq did nto help her , made it worse   There is no height or weight on file to calculate BMI. Zach Ochoa got covered , but  not using it as she had some bruises   She never tried  q-SYMIA per my notes , but she felt she tried it   She definitely has several comorbid states to be on weight loss meds   She did not use   q-symia but cannot afford  anymore,  As she lost her job           5. Vit d def - otc supplements       6.  Irregular cycles - off OCP - Infertility, secondary- s/p tubal adhesionolysis    Educated pt the risk for blood clots         Reviewed results with patient and discussed the labs being ordered today/bnv  Patient voiced understanding of plan of care

## 2022-12-01 NOTE — PATIENT INSTRUCTIONS
SPECIFIC INSTRUCTIONS BELOW     Metformin  500 mg twice a day with meals     trulicity 3 mg a week       Unithroid 75 mcg  A day, on empty stomach with water only, no other meds or food or drinks   For next half hour   Take any kind of vitamins, calcium, iron   Pills  4 hours later              -------------PAY ATTENTION TO THESE GENERAL INSTRUCTIONS -----------------      - The medications prescribed at this visit will not be available at pharmacy until 6 pm       - YOUR MED LIST IS NOT UP TO DATE AS SOME CHANGES ARE BEING MADE AFTER THE VISIT - FOLLOW SPECIFIC INSTRUCTIONS  ABOVE     -ANY tests other than blood work, which you opt to do  outside the  [facility] facilities, you are responsible for prior authorizations if  required    - 18 Vanessa Patrick UP TO DATE ON YOUR AVS- PLEASE IGNORE     Results     *Normal results will not be notified by a phone call starting January 1 2021   *If you have an upcoming visit, the results will be discussed at the visit   *Please sign up for MY CHART if you want access to your lab and test results  *Abnormal results which require immediate attention will be notified by phone call   *Abnormal results which do not require immediate assistance will be notified in 1-2 weeks       Refills    -    have your pharmacy send us a refill request . Refills are done max for one year and a visit is a must before refills are extended    Follow up appointments -  highly encourage you to make it when you are checking out. We can accommodate you into the schedule based on your clinical situation, but not for extending refills beyond a year. Labs are important to give refills and is important to get labs before the visit     Phone calls  -  Allow  24 hrs.  for non-urgent calls to be returned  Prior authorization - It may take 2-4 weeks to process  Forms  -  FMLA, DMV etc., will take up to 2 weeks to process  Cancellations - please notify the office 2 days in advance Samples  - will only be dispensed at visits       If not showing for the appointments and cancelling appointments within 24 hours are kept track of and three  of such situations in  two consecutive years will likely be considered for termination from the practice    -------------------------------------------------------------------------------------------------------------------

## 2022-12-12 DIAGNOSIS — I10 ESSENTIAL HYPERTENSION: ICD-10-CM

## 2022-12-12 RX ORDER — OLMESARTAN MEDOXOMIL AND HYDROCHLOROTHIAZIDE 40/25 40; 25 MG/1; MG/1
TABLET ORAL
Qty: 30 TABLET | Refills: 5 | Status: SHIPPED | OUTPATIENT
Start: 2022-12-12

## 2023-05-22 DIAGNOSIS — R73.03 PREDIABETES: ICD-10-CM

## 2023-05-22 RX ORDER — DULAGLUTIDE 3 MG/.5ML
INJECTION, SOLUTION SUBCUTANEOUS
Qty: 6 ML | Refills: 3 | Status: SHIPPED | OUTPATIENT
Start: 2023-05-22

## 2023-05-30 DIAGNOSIS — E28.2 PCOS (POLYCYSTIC OVARIAN SYNDROME): Primary | ICD-10-CM

## 2023-05-30 DIAGNOSIS — R73.03 PREDIABETES: ICD-10-CM

## 2023-05-30 DIAGNOSIS — I10 HYPERTENSION, UNSPECIFIED TYPE: ICD-10-CM

## 2023-06-23 LAB
ALBUMIN SERPL-MCNC: 4.3 G/DL (ref 3.8–4.8)
ALBUMIN/CREAT UR: 4 MG/G CREAT (ref 0–29)
ALBUMIN/GLOB SERPL: 1.3 {RATIO} (ref 1.2–2.2)
ALP SERPL-CCNC: 62 IU/L (ref 44–121)
ALT SERPL-CCNC: 36 IU/L (ref 0–32)
AST SERPL-CCNC: 32 IU/L (ref 0–40)
BILIRUB SERPL-MCNC: 0.4 MG/DL (ref 0–1.2)
BUN SERPL-MCNC: 11 MG/DL (ref 6–24)
BUN/CREAT SERPL: 13 (ref 9–23)
CALCIUM SERPL-MCNC: 9.5 MG/DL (ref 8.7–10.2)
CHLORIDE SERPL-SCNC: 104 MMOL/L (ref 96–106)
CHOLEST SERPL-MCNC: 190 MG/DL (ref 100–199)
CO2 SERPL-SCNC: 24 MMOL/L (ref 20–29)
CREAT SERPL-MCNC: 0.85 MG/DL (ref 0.57–1)
CREAT UR-MCNC: 434.7 MG/DL
EGFRCR SERPLBLD CKD-EPI 2021: 86 ML/MIN/1.73
EST. AVERAGE GLUCOSE BLD GHB EST-MCNC: 126 MG/DL
GLOBULIN SER CALC-MCNC: 3.3 G/DL (ref 1.5–4.5)
GLUCOSE SERPL-MCNC: 90 MG/DL (ref 70–99)
HBA1C MFR BLD: 6 % (ref 4.8–5.6)
HDLC SERPL-MCNC: 54 MG/DL
IMP & REVIEW OF LAB RESULTS: NORMAL
LDLC SERPL CALC-MCNC: 116 MG/DL (ref 0–99)
MICROALBUMIN UR-MCNC: 19.2 UG/ML
POTASSIUM SERPL-SCNC: 3.7 MMOL/L (ref 3.5–5.2)
PROT SERPL-MCNC: 7.6 G/DL (ref 6–8.5)
SODIUM SERPL-SCNC: 144 MMOL/L (ref 134–144)
TRIGL SERPL-MCNC: 110 MG/DL (ref 0–149)
TSH SERPL DL<=0.005 MIU/L-ACNC: 3.76 UIU/ML (ref 0.45–4.5)
VLDLC SERPL CALC-MCNC: 20 MG/DL (ref 5–40)

## 2023-06-29 ENCOUNTER — OFFICE VISIT (OUTPATIENT)
Age: 48
End: 2023-06-29

## 2023-06-29 VITALS
HEART RATE: 77 BPM | HEIGHT: 65 IN | SYSTOLIC BLOOD PRESSURE: 172 MMHG | WEIGHT: 293 LBS | BODY MASS INDEX: 48.82 KG/M2 | OXYGEN SATURATION: 98 % | TEMPERATURE: 97 F | DIASTOLIC BLOOD PRESSURE: 104 MMHG | RESPIRATION RATE: 18 BRPM

## 2023-06-29 DIAGNOSIS — E55.9 VITAMIN D DEFICIENCY, UNSPECIFIED: ICD-10-CM

## 2023-06-29 DIAGNOSIS — E28.2 POLYCYSTIC OVARIAN SYNDROME: ICD-10-CM

## 2023-06-29 DIAGNOSIS — E06.3 HYPOTHYROIDISM DUE TO HASHIMOTO'S THYROIDITIS: ICD-10-CM

## 2023-06-29 DIAGNOSIS — E03.8 HYPOTHYROIDISM DUE TO HASHIMOTO'S THYROIDITIS: ICD-10-CM

## 2023-06-29 DIAGNOSIS — I10 ESSENTIAL (PRIMARY) HYPERTENSION: ICD-10-CM

## 2023-06-29 RX ORDER — OLMESARTAN MEDOXOMIL AND HYDROCHLOROTHIAZIDE 40/25 40; 25 MG/1; MG/1
1 TABLET ORAL DAILY
Qty: 90 TABLET | Refills: 3 | Status: SHIPPED | OUTPATIENT
Start: 2023-06-29

## 2023-06-29 RX ORDER — LABETALOL 200 MG/1
TABLET, FILM COATED ORAL
Qty: 180 TABLET | Refills: 3 | Status: SHIPPED | OUTPATIENT
Start: 2023-06-29

## 2023-08-03 ENCOUNTER — HOSPITAL ENCOUNTER (EMERGENCY)
Facility: HOSPITAL | Age: 48
Discharge: HOME | End: 2023-08-03
Attending: EMERGENCY MEDICINE
Payer: COMMERCIAL

## 2023-08-03 VITALS
DIASTOLIC BLOOD PRESSURE: 96 MMHG | SYSTOLIC BLOOD PRESSURE: 160 MMHG | HEART RATE: 86 BPM | TEMPERATURE: 99 F | HEIGHT: 66 IN | RESPIRATION RATE: 19 BRPM | OXYGEN SATURATION: 99 % | WEIGHT: 293 LBS | BODY MASS INDEX: 47.09 KG/M2

## 2023-08-03 DIAGNOSIS — J35.8 TONSIL STONE: Primary | ICD-10-CM

## 2023-08-03 DIAGNOSIS — S46.912A STRAIN OF LEFT SHOULDER, INITIAL ENCOUNTER: ICD-10-CM

## 2023-08-03 PROCEDURE — 99283 EMERGENCY DEPT VISIT LOW MDM: CPT

## 2023-08-03 PROCEDURE — 6370000000 HC RX 637 (ALT 250 FOR IP): Performed by: EMERGENCY MEDICINE

## 2023-08-03 RX ORDER — PREDNISONE 20 MG/1
60 TABLET ORAL
Status: COMPLETED | OUTPATIENT
Start: 2023-08-03 | End: 2023-08-03

## 2023-08-03 RX ORDER — PREDNISONE 20 MG/1
TABLET ORAL
Qty: 12 TABLET | Refills: 1 | Status: SHIPPED | OUTPATIENT
Start: 2023-08-03

## 2023-08-03 RX ORDER — IBUPROFEN 800 MG/1
800 TABLET ORAL
Qty: 20 TABLET | Refills: 0 | Status: SHIPPED | OUTPATIENT
Start: 2023-08-03

## 2023-08-03 RX ORDER — IBUPROFEN 800 MG/1
800 TABLET ORAL
Status: COMPLETED | OUTPATIENT
Start: 2023-08-03 | End: 2023-08-03

## 2023-08-03 RX ORDER — ACETAMINOPHEN 500 MG
1000 TABLET ORAL
Status: COMPLETED | OUTPATIENT
Start: 2023-08-03 | End: 2023-08-03

## 2023-08-03 RX ADMIN — PREDNISONE 60 MG: 20 TABLET ORAL at 21:24

## 2023-08-03 RX ADMIN — IBUPROFEN 800 MG: 800 TABLET ORAL at 21:24

## 2023-08-03 RX ADMIN — ACETAMINOPHEN 1000 MG: 500 TABLET ORAL at 21:23

## 2023-08-03 ASSESSMENT — PAIN DESCRIPTION - DESCRIPTORS: DESCRIPTORS: ACHING

## 2023-08-03 ASSESSMENT — PAIN SCALES - GENERAL: PAINLEVEL_OUTOF10: 2

## 2023-08-03 ASSESSMENT — PAIN - FUNCTIONAL ASSESSMENT: PAIN_FUNCTIONAL_ASSESSMENT: 0-10

## 2023-08-03 ASSESSMENT — PAIN DESCRIPTION - ORIENTATION: ORIENTATION: RIGHT

## 2023-08-03 ASSESSMENT — PAIN DESCRIPTION - LOCATION: LOCATION: ARM;THROAT

## 2023-08-03 ASSESSMENT — PAIN DESCRIPTION - PAIN TYPE: TYPE: ACUTE PAIN

## 2023-08-04 NOTE — ED NOTES
Pt discharge at this time. D/C instructions reviewed by this nurse and Pt verbalized understanding. Medications reviewed and pharmacy confirmed. Answers all questions to the best of my ability.       Vikash Portillo RN  08/03/23 [id number]

## 2023-08-04 NOTE — ED TRIAGE NOTES
Left arm pain for 3 weeks  Denies Injury    Throat pain  States she has tonsil stones    Has not taken any meds    AO x's 4

## 2023-08-04 NOTE — ED PROVIDER NOTES
fexofenadine 180 MG tablet  Commonly known as: ALLEGRA     labetalol 200 MG tablet  Commonly known as: Laith Sin  One pill 3 times a day     levothyroxine 75 MCG tablet  Commonly known as: Unithroid  Take 1 tablet by mouth daily     metFORMIN 500 MG tablet  Commonly known as: GLUCOPHAGE  One pill twice a day with meals     naproxen 500 MG tablet  Commonly known as: NAPROSYN     olmesartan-hydroCHLOROthiazide 40-25 MG per tablet  Commonly known as: BENICAR HCT  Take 1 tablet by mouth daily     sertraline 50 MG tablet  Commonly known as: ZOLOFT     Slynd 4 MG Tabs  Generic drug: Drospirenone     Trulicity 3 IR/4.4QQ Sopn  Generic drug: Dulaglutide  INJECT 3 MG UNDERSKIN EVERY 7 DAYS               Where to Get Your Medications        These medications were sent to [facility]/pharmacy [address] [place] [address] [facility] [address] [phone] [address] [facility] [address]      Phone: [phone]   ibuprofen 800 MG tablet  predniSONE 20 MG tablet       2. [unfilled]  Return to ED if worse     Diagnosis     Clinical Impression:   1. Tonsil stone    2. Strain of left shoulder, initial encounter        Please note that this dictation was completed with [employer], the computer voice recognition software. Quite often unanticipated grammatical, syntax, homophones, and other interpretive errors are inadvertently transcribed by the computer software. Please disregard these errors. Please excuse any errors that have escaped final proofreading. Thank you.       Magdi Aleman MD  08/03/23 [id number]

## 2024-03-07 ENCOUNTER — HOSPITAL ENCOUNTER (EMERGENCY)
Facility: HOSPITAL | Age: 49
Discharge: HOME | End: 2024-03-07
Attending: EMERGENCY MEDICINE
Payer: COMMERCIAL

## 2024-03-07 ENCOUNTER — APPOINTMENT (OUTPATIENT)
Facility: HOSPITAL | Age: 49
End: 2024-03-07
Payer: COMMERCIAL

## 2024-03-07 VITALS
SYSTOLIC BLOOD PRESSURE: 150 MMHG | OXYGEN SATURATION: 96 % | RESPIRATION RATE: 18 BRPM | TEMPERATURE: 98.1 F | BODY MASS INDEX: 47.09 KG/M2 | HEART RATE: 79 BPM | HEIGHT: 66 IN | DIASTOLIC BLOOD PRESSURE: 109 MMHG | WEIGHT: 293 LBS

## 2024-03-07 DIAGNOSIS — M54.12 RIGHT CERVICAL RADICULOPATHY: Primary | ICD-10-CM

## 2024-03-07 LAB
APPEARANCE UR: ABNORMAL
BACTERIA URNS QL MICRO: NEGATIVE /HPF
BILIRUB UR QL: NEGATIVE
C TRACH DNA SPEC QL NAA+PROBE: NEGATIVE
CLUE CELLS VAG QL WET PREP: NEGATIVE
COLOR UR: ABNORMAL
GLUCOSE UR STRIP.AUTO-MCNC: NEGATIVE MG/DL
HCG, URINE, POC: NEGATIVE
HGB UR QL STRIP: NEGATIVE
KETONES UR QL STRIP.AUTO: NEGATIVE MG/DL
KOH PREP SPEC: NORMAL
LEUKOCYTE ESTERASE UR QL STRIP.AUTO: NEGATIVE
Lab: NORMAL
Lab: NORMAL
MUCOUS THREADS URNS QL MICRO: ABNORMAL /LPF
N GONORRHOEA DNA SPEC QL NAA+PROBE: NEGATIVE
NEGATIVE QC PASS/FAIL: NORMAL
NITRITE UR QL STRIP.AUTO: NEGATIVE
PH UR STRIP: 6 (ref 5–8)
POSITIVE QC PASS/FAIL: NORMAL
PROT UR STRIP-MCNC: NEGATIVE MG/DL
RBC #/AREA URNS HPF: ABNORMAL /HPF (ref 0–5)
SAMPLE TYPE: NORMAL
SERVICE CMNT-IMP: NORMAL
SP GR UR REFRACTOMETRY: 1.02 (ref 1–1.03)
SPECIMEN SOURCE: NORMAL
T VAGINALIS VAG QL WET PREP: NEGATIVE
UROBILINOGEN UR QL STRIP.AUTO: 0.1 EU/DL (ref 0.1–1)
WBC URNS QL MICRO: ABNORMAL /HPF (ref 0–4)

## 2024-03-07 PROCEDURE — 72040 X-RAY EXAM NECK SPINE 2-3 VW: CPT

## 2024-03-07 PROCEDURE — 87210 SMEAR WET MOUNT SALINE/INK: CPT

## 2024-03-07 PROCEDURE — 99284 EMERGENCY DEPT VISIT MOD MDM: CPT

## 2024-03-07 PROCEDURE — 6360000002 HC RX W HCPCS: Performed by: EMERGENCY MEDICINE

## 2024-03-07 PROCEDURE — 87591 N.GONORRHOEAE DNA AMP PROB: CPT

## 2024-03-07 PROCEDURE — 96372 THER/PROPH/DIAG INJ SC/IM: CPT

## 2024-03-07 PROCEDURE — 81001 URINALYSIS AUTO W/SCOPE: CPT

## 2024-03-07 PROCEDURE — 87491 CHLMYD TRACH DNA AMP PROBE: CPT

## 2024-03-07 RX ORDER — KETOROLAC TROMETHAMINE 10 MG/1
10 TABLET, FILM COATED ORAL EVERY 6 HOURS PRN
Qty: 20 TABLET | Refills: 0 | Status: SHIPPED | OUTPATIENT
Start: 2024-03-07

## 2024-03-07 RX ORDER — KETOROLAC TROMETHAMINE 30 MG/ML
30 INJECTION, SOLUTION INTRAMUSCULAR; INTRAVENOUS
Status: COMPLETED | OUTPATIENT
Start: 2024-03-07 | End: 2024-03-07

## 2024-03-07 RX ADMIN — KETOROLAC TROMETHAMINE 30 MG: 30 INJECTION, SOLUTION INTRAMUSCULAR; INTRAVENOUS at 04:09

## 2024-03-07 ASSESSMENT — PAIN SCALES - GENERAL: PAINLEVEL_OUTOF10: 10

## 2024-03-07 ASSESSMENT — PAIN - FUNCTIONAL ASSESSMENT: PAIN_FUNCTIONAL_ASSESSMENT: 0-10

## 2024-03-07 NOTE — ED TRIAGE NOTES
States her right arm has been hurting. Otc meds not helping. Feels like she has a lump in her throat but \"I'm not sure if that's related\". Also has a \"raging yeast infection\"

## 2024-03-07 NOTE — DISCHARGE INSTRUCTIONS
Thank you!  Thank you for allowing me to care for you in the emergency department. It is my goal to provide you with excellent care.  Please fill out the survey that will come to you by mail or email since we listen to your feedback!     Below you will find a list of your tests from today's visit.  Should you have any questions, please do not hesitate to call the emergency department.    Labs  Recent Results (from the past 12 hour(s))   Urinalysis with Microscopic    Collection Time: 03/07/24  4:06 AM   Result Value Ref Range    Color, UA Yellow/Straw      Appearance Turbid (A) Clear      Specific Gravity, UA 1.021 1.003 - 1.030      pH, Urine 6.0 5.0 - 8.0      Protein, UA Negative Negative mg/dL    Glucose, UA Negative Negative mg/dL    Ketones, Urine Negative Negative mg/dL    Bilirubin Urine Negative Negative      Blood, Urine Negative Negative      Urobilinogen, Urine 0.1 0.1 - 1.0 EU/dL    Nitrite, Urine Negative Negative      Leukocyte Esterase, Urine Negative Negative      WBC, UA 0-4 0 - 4 /hpf    RBC, UA 0-5 0 - 5 /hpf    BACTERIA, URINE Negative Negative /hpf    Mucus, UA Trace /lpf   KOH, Other Sources    Collection Time: 03/07/24  4:06 AM    Specimen: Vaginal swab   Result Value Ref Range    Special Requests No Special Requests      KYMBERLY Prep No yeast seen     Wet prep, genital    Collection Time: 03/07/24  4:06 AM    Specimen: Vaginal   Result Value Ref Range    Clue Cells, Wet Prep Negative      Trich, Wet Prep Negative     POC Pregnancy Urine Qual    Collection Time: 03/07/24  4:06 AM   Result Value Ref Range    HCG, Urine, POC Negative Negative    Lot Number [id number]     Positive QC Pass/Fail Pass     Negative QC Pass/Fail Pass        Radiologic Studies  XR CERVICAL SPINE (2-3 VIEWS)   Final Result   No acute process.           ------------------------------------------------------------------------------------------------------------  The exam and treatment you received in the Emergency

## 2024-03-07 NOTE — ED PROVIDER NOTES
[facility] EMERGENCY DEPT  EMERGENCY DEPARTMENT HISTORY AND PHYSICAL EXAM      Date: 3/7/2024  Patient Name: Magalie Daniels  MRN: [id number]  YOB: 1975  Date of evaluation: 3/7/2024  Provider: Benny Maradiaga MD   Note Started: 3:57 AM EST 3/7/24    HISTORY OF PRESENT ILLNESS     Chief Complaint   Patient presents with    Vaginal Discharge    Arm Pain       History Provided By: Patient    HPI: Magalie Daniels is a 48 y.o. female presents emergency department concern for yeast infection.  Patient also reports pain to her entire right arm.  Patient ports history of the same, reports history of bursitis.  Patient ports over-the-counter meds without improvement.  Patient denies numbness or tingling.    PAST MEDICAL HISTORY   Past Medical History:  Past Medical History:   Diagnosis Date    Depression     Gestational diabetes     Hypertension 1/27/2011    Morbid obesity (HCC) 1/27/2011    Nocturia 1/27/2011    Pedal edema 1/27/2011    Reflux 1/27/2011    Snoring 1/27/2011    SOB (shortness of breath) 1/27/2011    Thyroid disease        Past Surgical History:  Past Surgical History:   Procedure Laterality Date    CHOLECYSTECTOMY  03/17/2011    CHOLECYSTECTOMY, LAPAROSCOPIC  3/17/2011    by Dr Rae    DILATION AND CURETTAGE OF UTERUS  2018    GI  10/03/06    lap band placement Dr Bajwa [facility]    GYN  03/12/2009    C section     OTHER SURGICAL HISTORY  10/03/2006    lap band placement 10 -[facility]-Dr. BRANDIE Bajwa       Family History:  Family History   Problem Relation Age of Onset    Cancer Mother     Osteoporosis Mother         overweight, sleep apenea    Hypertension Mother     Lung Disease Mother     Diabetes Mother     Diabetes Maternal Grandfather        Social History:  Social History     Tobacco Use    Smoking status: Never    Smokeless tobacco: Never   Substance Use Topics    Alcohol use: No    Drug use: No       Allergies:  Allergies   Allergen Reactions    Penicillins Swelling    Hydralazine Other (See

## 2024-04-08 ENCOUNTER — APPOINTMENT (OUTPATIENT)
Facility: HOSPITAL | Age: 49
End: 2024-04-08
Payer: COMMERCIAL

## 2024-04-08 ENCOUNTER — HOSPITAL ENCOUNTER (EMERGENCY)
Facility: HOSPITAL | Age: 49
Discharge: HOME | End: 2024-04-08
Attending: EMERGENCY MEDICINE
Payer: COMMERCIAL

## 2024-04-08 VITALS
SYSTOLIC BLOOD PRESSURE: 167 MMHG | TEMPERATURE: 97.7 F | BODY MASS INDEX: 47.09 KG/M2 | OXYGEN SATURATION: 95 % | WEIGHT: 293 LBS | RESPIRATION RATE: 18 BRPM | DIASTOLIC BLOOD PRESSURE: 117 MMHG | HEART RATE: 77 BPM | HEIGHT: 66 IN

## 2024-04-08 DIAGNOSIS — M25.552 LEFT HIP PAIN: ICD-10-CM

## 2024-04-08 DIAGNOSIS — M76.32 ILIOTIBIAL BAND SYNDROME OF LEFT SIDE: Primary | ICD-10-CM

## 2024-04-08 PROCEDURE — 96372 THER/PROPH/DIAG INJ SC/IM: CPT

## 2024-04-08 PROCEDURE — 6370000000 HC RX 637 (ALT 250 FOR IP)

## 2024-04-08 PROCEDURE — 6360000002 HC RX W HCPCS

## 2024-04-08 PROCEDURE — 73502 X-RAY EXAM HIP UNI 2-3 VIEWS: CPT

## 2024-04-08 PROCEDURE — 99284 EMERGENCY DEPT VISIT MOD MDM: CPT

## 2024-04-08 RX ORDER — METHOCARBAMOL 750 MG/1
1500 TABLET, FILM COATED ORAL ONCE
Status: COMPLETED | OUTPATIENT
Start: 2024-04-08 | End: 2024-04-08

## 2024-04-08 RX ORDER — PREDNISONE 20 MG/1
60 TABLET ORAL
Status: COMPLETED | OUTPATIENT
Start: 2024-04-08 | End: 2024-04-08

## 2024-04-08 RX ORDER — HYDROMORPHONE HYDROCHLORIDE 1 MG/ML
1 INJECTION, SOLUTION INTRAMUSCULAR; INTRAVENOUS; SUBCUTANEOUS
Status: COMPLETED | OUTPATIENT
Start: 2024-04-08 | End: 2024-04-08

## 2024-04-08 RX ORDER — LIDOCAINE 4 G/G
1 PATCH TOPICAL
Status: DISCONTINUED | OUTPATIENT
Start: 2024-04-08 | End: 2024-04-08 | Stop reason: HOSPADM

## 2024-04-08 RX ORDER — METHOCARBAMOL 750 MG/1
750 TABLET, FILM COATED ORAL 3 TIMES DAILY
Qty: 15 TABLET | Refills: 0 | Status: SHIPPED | OUTPATIENT
Start: 2024-04-08 | End: 2024-04-10 | Stop reason: ALTCHOICE

## 2024-04-08 RX ORDER — KETOROLAC TROMETHAMINE 10 MG/1
10 TABLET, FILM COATED ORAL EVERY 6 HOURS PRN
Qty: 20 TABLET | Refills: 0 | Status: SHIPPED | OUTPATIENT
Start: 2024-04-08

## 2024-04-08 RX ORDER — PREDNISONE 20 MG/1
40 TABLET ORAL DAILY
Qty: 8 TABLET | Refills: 0 | Status: SHIPPED | OUTPATIENT
Start: 2024-04-08 | End: 2024-04-12

## 2024-04-08 RX ORDER — OXYCODONE HYDROCHLORIDE AND ACETAMINOPHEN 5; 325 MG/1; MG/1
1 TABLET ORAL EVERY 6 HOURS PRN
Qty: 9 TABLET | Refills: 0 | Status: SHIPPED | OUTPATIENT
Start: 2024-04-08 | End: 2024-04-11

## 2024-04-08 RX ORDER — KETOROLAC TROMETHAMINE 30 MG/ML
30 INJECTION, SOLUTION INTRAMUSCULAR; INTRAVENOUS
Status: COMPLETED | OUTPATIENT
Start: 2024-04-08 | End: 2024-04-08

## 2024-04-08 RX ADMIN — METHOCARBAMOL 1500 MG: 750 TABLET ORAL at 13:53

## 2024-04-08 RX ADMIN — KETOROLAC TROMETHAMINE 30 MG: 30 INJECTION INTRAMUSCULAR; INTRAVENOUS at 10:47

## 2024-04-08 RX ADMIN — HYDROMORPHONE HYDROCHLORIDE 1 MG: 1 INJECTION, SOLUTION INTRAMUSCULAR; INTRAVENOUS; SUBCUTANEOUS at 12:06

## 2024-04-08 RX ADMIN — PREDNISONE 60 MG: 20 TABLET ORAL at 10:46

## 2024-04-08 ASSESSMENT — LIFESTYLE VARIABLES
HOW OFTEN DO YOU HAVE A DRINK CONTAINING ALCOHOL: NEVER
HOW MANY STANDARD DRINKS CONTAINING ALCOHOL DO YOU HAVE ON A TYPICAL DAY: PATIENT DOES NOT DRINK

## 2024-04-08 ASSESSMENT — PAIN - FUNCTIONAL ASSESSMENT
PAIN_FUNCTIONAL_ASSESSMENT: 0-10
PAIN_FUNCTIONAL_ASSESSMENT: 0-10
PAIN_FUNCTIONAL_ASSESSMENT: PREVENTS OR INTERFERES SOME ACTIVE ACTIVITIES AND ADLS

## 2024-04-08 ASSESSMENT — PAIN SCALES - GENERAL
PAINLEVEL_OUTOF10: 8
PAINLEVEL_OUTOF10: 10

## 2024-04-08 ASSESSMENT — PAIN DESCRIPTION - ORIENTATION: ORIENTATION: LEFT

## 2024-04-08 ASSESSMENT — PAIN DESCRIPTION - LOCATION: LOCATION: HIP;LEG

## 2024-04-08 ASSESSMENT — PAIN DESCRIPTION - FREQUENCY: FREQUENCY: CONTINUOUS

## 2024-04-08 NOTE — ED TRIAGE NOTES
Reports she awakened this AM c pain to L hip into L thigh. Denies any injury. Reports PMH: Bursitis

## 2024-04-08 NOTE — DISCHARGE INSTRUCTIONS
Thank you!  Thank you for allowing me to care for you in the emergency department. It is my goal to provide you with excellent care. If you have not received excellent quality care, please ask to speak to the nurse manager. Please fill out the survey that will come to you by mail or email since we listen to your feedback!     Below you will find a list of your tests from today's visit.  Should you have any questions, please do not hesitate to call the emergency department.    Labs  No results found for this or any previous visit (from the past 12 hour(s)).    Radiologic Studies  XR HIP 2-3 VW W PELVIS LEFT   Final Result   No acute abnormality.        [unfilled]  [unfilled]  ------------------------------------------------------------------------------------------------------------  The exam and treatment you received in the Emergency Department were for an urgent problem and are not intended as complete care. It is important that you follow-up with a doctor, nurse practitioner, or physician assistant to:  (1) confirm your diagnosis,  (2) re-evaluation of changes in your illness and treatment, and  (3) for ongoing care. Please take your discharge instructions with you when you go to your follow-up appointment.     If you have any problem arranging a follow-up appointment, contact the Emergency Department.  If your symptoms become worse or you do not improve as expected and you are unable to reach your health care provider, please return to the Emergency Department. We are available 24 hours a day.     If a prescription has been provided, please have it filled as soon as possible to prevent a delay in treatment. If you have any questions or reservations about taking the medication due to side effects or interactions with other medications, please call your primary care provider or contact the ER.

## 2024-04-10 ENCOUNTER — HOSPITAL ENCOUNTER (EMERGENCY)
Facility: HOSPITAL | Age: 49
Discharge: HOME | End: 2024-04-10
Payer: COMMERCIAL

## 2024-04-10 VITALS
TEMPERATURE: 97.8 F | HEIGHT: 66 IN | DIASTOLIC BLOOD PRESSURE: 87 MMHG | BODY MASS INDEX: 47.09 KG/M2 | RESPIRATION RATE: 18 BRPM | HEART RATE: 87 BPM | OXYGEN SATURATION: 98 % | SYSTOLIC BLOOD PRESSURE: 156 MMHG | WEIGHT: 293 LBS

## 2024-04-10 DIAGNOSIS — M54.42 ACUTE LEFT-SIDED LOW BACK PAIN WITH LEFT-SIDED SCIATICA: Primary | ICD-10-CM

## 2024-04-10 LAB
APPEARANCE UR: CLEAR
BACTERIA URNS QL MICRO: NEGATIVE /HPF
BILIRUB UR QL: NEGATIVE
COLOR UR: ABNORMAL
EPITH CASTS URNS QL MICRO: ABNORMAL /LPF
GLUCOSE UR STRIP.AUTO-MCNC: NEGATIVE MG/DL
HGB UR QL STRIP: NEGATIVE
KETONES UR QL STRIP.AUTO: NEGATIVE MG/DL
LEUKOCYTE ESTERASE UR QL STRIP.AUTO: NEGATIVE
MUCOUS THREADS URNS QL MICRO: ABNORMAL /LPF
NITRITE UR QL STRIP.AUTO: NEGATIVE
PH UR STRIP: 5 (ref 5–8)
PROT UR STRIP-MCNC: NEGATIVE MG/DL
RBC #/AREA URNS HPF: ABNORMAL /HPF (ref 0–5)
SP GR UR REFRACTOMETRY: 1.03 (ref 1–1.03)
URINE CULTURE IF INDICATED: ABNORMAL
UROBILINOGEN UR QL STRIP.AUTO: 0.1 EU/DL (ref 0.1–1)
WBC URNS QL MICRO: ABNORMAL /HPF (ref 0–4)

## 2024-04-10 PROCEDURE — 6360000002 HC RX W HCPCS: Performed by: NURSE PRACTITIONER

## 2024-04-10 PROCEDURE — 6370000000 HC RX 637 (ALT 250 FOR IP): Performed by: NURSE PRACTITIONER

## 2024-04-10 PROCEDURE — 99284 EMERGENCY DEPT VISIT MOD MDM: CPT

## 2024-04-10 PROCEDURE — 81001 URINALYSIS AUTO W/SCOPE: CPT

## 2024-04-10 PROCEDURE — 96372 THER/PROPH/DIAG INJ SC/IM: CPT

## 2024-04-10 RX ORDER — METHOCARBAMOL 500 MG/1
1000 TABLET, FILM COATED ORAL ONCE
Status: COMPLETED | OUTPATIENT
Start: 2024-04-10 | End: 2024-04-10

## 2024-04-10 RX ORDER — METHOCARBAMOL 500 MG/1
1000 TABLET, FILM COATED ORAL 4 TIMES DAILY
Qty: 30 TABLET | Refills: 0 | Status: SHIPPED | OUTPATIENT
Start: 2024-04-10 | End: 2024-04-10

## 2024-04-10 RX ORDER — METHOCARBAMOL 750 MG/1
750 TABLET, FILM COATED ORAL 4 TIMES DAILY
Qty: 40 TABLET | Refills: 0 | Status: SHIPPED | OUTPATIENT
Start: 2024-04-10 | End: 2024-04-10 | Stop reason: ALTCHOICE

## 2024-04-10 RX ORDER — MORPHINE SULFATE 4 MG/ML
4 INJECTION, SOLUTION INTRAMUSCULAR; INTRAVENOUS
Status: COMPLETED | OUTPATIENT
Start: 2024-04-10 | End: 2024-04-10

## 2024-04-10 RX ORDER — LIDOCAINE 4 G/G
1 PATCH TOPICAL DAILY
Status: DISCONTINUED | OUTPATIENT
Start: 2024-04-10 | End: 2024-04-10 | Stop reason: HOSPADM

## 2024-04-10 RX ORDER — TRAMADOL HYDROCHLORIDE 50 MG/1
50 TABLET ORAL
Status: DISCONTINUED | OUTPATIENT
Start: 2024-04-10 | End: 2024-04-10

## 2024-04-10 RX ORDER — LIDOCAINE HYDROCHLORIDE 40 MG/ML
SOLUTION TOPICAL
Qty: 50 ML | Refills: 1 | Status: SHIPPED | OUTPATIENT
Start: 2024-04-10 | End: 2024-04-17

## 2024-04-10 RX ORDER — TIZANIDINE 4 MG/1
4 TABLET ORAL 3 TIMES DAILY PRN
Qty: 21 TABLET | Refills: 0 | Status: SHIPPED | OUTPATIENT
Start: 2024-04-10

## 2024-04-10 RX ADMIN — MORPHINE SULFATE 4 MG: 4 INJECTION, SOLUTION INTRAMUSCULAR; INTRAVENOUS at 15:14

## 2024-04-10 RX ADMIN — METHOCARBAMOL 1000 MG: 500 TABLET ORAL at 12:41

## 2024-04-10 ASSESSMENT — PAIN SCALES - GENERAL
PAINLEVEL_OUTOF10: 8
PAINLEVEL_OUTOF10: 10
PAINLEVEL_OUTOF10: 8
PAINLEVEL_OUTOF10: 10
PAINLEVEL_OUTOF10: 10

## 2024-04-10 ASSESSMENT — PAIN DESCRIPTION - FREQUENCY: FREQUENCY: CONTINUOUS

## 2024-04-10 ASSESSMENT — PAIN DESCRIPTION - LOCATION
LOCATION: BACK;LEG;KNEE
LOCATION: KNEE;LEG;HIP

## 2024-04-10 ASSESSMENT — PAIN - FUNCTIONAL ASSESSMENT
PAIN_FUNCTIONAL_ASSESSMENT: ACTIVITIES ARE NOT PREVENTED
PAIN_FUNCTIONAL_ASSESSMENT: 0-10

## 2024-04-10 ASSESSMENT — PAIN DESCRIPTION - DESCRIPTORS
DESCRIPTORS: SHOOTING
DESCRIPTORS: SHOOTING

## 2024-04-10 ASSESSMENT — PAIN DESCRIPTION - ORIENTATION: ORIENTATION: LEFT

## 2024-04-10 NOTE — ED TRIAGE NOTES
Pt reports that she was seen Monday for pain left sided, back, hip, and thigh, diagnosed with iliotibial band syndrome. Returns today for continued pain that has moved to left knee as well and inability to lay or sit down.

## 2024-04-10 NOTE — ED PROVIDER NOTES
[facility] EMERGENCY DEPT  EMERGENCY DEPARTMENT HISTORY AND PHYSICAL EXAM      Date: 4/10/2024  Patient Name: Magalie Daniels  MRN: [id number]  YOB: 1975  Date of evaluation: 4/10/2024  Provider: ADONIS Skinner NP   Note Started: 12:48 PM EDT 4/10/24    HISTORY OF PRESENT ILLNESS     Chief Complaint   Patient presents with    Back Pain    Thigh Pain    Knee Pain    Hip Pain       History Provided By: Patient    HPI: Magalie Daniels is a 48 y.o. female past medical history reviewed as listed below presents with complaint of unresolved and unimproved left lower back pain and tightness radiating down her left hip and left upper leg to her knee.  She was seen on the eighth of this month at this department and diagnosed with iliotibial band syndrome and given prednisone prescription, Robaxin 750 mg and Toradol which she states she has been taking and there has been no improvement.  She is unable to find a comfortable position to sit in.  She denies any dysuria, abdominal pain, fever, chills or bodyaches, falls, vaginal symptoms.  She is on acyclovir for maintenance therapy but denies any rashes nothing on exam to her lower back.  She is able to ambulate however she has to stay in a bent over position for any sort of comfort.  No improvement with heating pads.  No other exacerbating or alleviating measures attempted or identified.    PAST MEDICAL HISTORY   Past Medical History:  Past Medical History:   Diagnosis Date    Depression     Gestational diabetes     Hypertension 1/27/2011    Morbid obesity (HCC) 1/27/2011    Nocturia 1/27/2011    Pedal edema 1/27/2011    Reflux 1/27/2011    Snoring 1/27/2011    SOB (shortness of breath) 1/27/2011    Thyroid disease        Past Surgical History:  Past Surgical History:   Procedure Laterality Date    CHOLECYSTECTOMY  03/17/2011    CHOLECYSTECTOMY, LAPAROSCOPIC  3/17/2011    by Dr Rae    DILATION AND CURETTAGE OF UTERUS  2018    GI  10/03/06    lap band placement

## 2024-04-10 NOTE — DISCHARGE INSTR - COC
156/87   Pulse 87   Temp 97.8 °F (36.6 °C)   Resp 18   Ht 1.676 m (5' 6\")   Wt (!) 142.9 kg (315 lb)   LMP 03/15/2024 (Approximate)   SpO2 98%   BMI 50.84 kg/m²     Last documented pain score (0-10 scale): Pain Level: 8  Last Weight:   Wt Readings from Last 1 Encounters:   04/10/24 (!) 142.9 kg (315 lb)     Mental Status:  {IP PT MENTAL STATUS:}    IV Access:  { DMITRI IV ACCESS:678281743}    Nursing Mobility/ADLs:  Walking   {CHP DME ADLs:458845438}  Transfer  {CHP DME ADLs:014043144}  Bathing  {CHP DME ADLs:072493830}  Dressing  {CHP DME ADLs:295695288}  Toileting  {CHP DME ADLs:707775292}  Feeding  {CHP DME ADLs:964515570}  Med Admin  {CHP DME ADLs:234670913}  Med Delivery   { DMITRI MED Delivery:023316372}    Wound Care Documentation and Therapy:        Elimination:  Continence:   Bowel: {YES / NO:}  Bladder: {YES / NO:}  Urinary Catheter: {Urinary Catheter:305150008}   Colostomy/Ileostomy/Ileal Conduit: {YES / NO:}       Date of Last BM: ***  No intake or output data in the 24 hours ending 04/10/24 1542  No intake/output data recorded.    Safety Concerns:     { DMITRI Safety Concerns:320477184}    Impairments/Disabilities:      { DMITRI Impairments/Disabilities:702958090}    Nutrition Therapy:  Current Nutrition Therapy:   { DMITRI Diet List:031369666}    Routes of Feeding: {CHP DME Other Feedings:657809096}  Liquids: {Slp liquid thickness:23741}  Daily Fluid Restriction: {CHP DME Yes amt example:699316659}  Last Modified Barium Swallow with Video (Video Swallowing Test): {Done Not Done Date:}    Treatments at the Time of Hospital Discharge:   Respiratory Treatments: ***  Oxygen Therapy:  {Therapy; copd oxygen:85803}  Ventilator:    { CC Vent List:628795859}    Rehab Therapies: {THERAPEUTIC INTERVENTION:7259505861}  Weight Bearing Status/Restrictions: { CC Weight Bearin}  Other Medical Equipment (for information only, NOT a DME order):  {EQUIPMENT:989857812}  Other

## 2024-06-18 ENCOUNTER — OFFICE VISIT (OUTPATIENT)
Age: 49
End: 2024-06-18
Payer: COMMERCIAL

## 2024-06-18 VITALS
RESPIRATION RATE: 20 BRPM | HEART RATE: 71 BPM | WEIGHT: 293 LBS | DIASTOLIC BLOOD PRESSURE: 83 MMHG | BODY MASS INDEX: 47.09 KG/M2 | OXYGEN SATURATION: 95 % | HEIGHT: 66 IN | SYSTOLIC BLOOD PRESSURE: 141 MMHG

## 2024-06-18 DIAGNOSIS — E03.8 HYPOTHYROIDISM DUE TO HASHIMOTO'S THYROIDITIS: Primary | ICD-10-CM

## 2024-06-18 DIAGNOSIS — E06.3 HYPOTHYROIDISM DUE TO HASHIMOTO'S THYROIDITIS: Primary | ICD-10-CM

## 2024-06-18 DIAGNOSIS — E55.9 VITAMIN D DEFICIENCY, UNSPECIFIED: ICD-10-CM

## 2024-06-18 DIAGNOSIS — E03.8 OTHER SPECIFIED HYPOTHYROIDISM: ICD-10-CM

## 2024-06-18 DIAGNOSIS — M54.30 SCIATICA, UNSPECIFIED LATERALITY: ICD-10-CM

## 2024-06-18 LAB
ALBUMIN SERPL-MCNC: 3.6 G/DL (ref 3.5–5)
ALBUMIN/GLOB SERPL: 1 (ref 1.1–2.2)
ALP SERPL-CCNC: 56 U/L (ref 45–117)
ALT SERPL-CCNC: 38 U/L (ref 12–78)
ANION GAP SERPL CALC-SCNC: 6 MMOL/L (ref 5–15)
AST SERPL-CCNC: 28 U/L (ref 15–37)
BILIRUB SERPL-MCNC: 0.4 MG/DL (ref 0.2–1)
BUN SERPL-MCNC: 10 MG/DL (ref 6–20)
BUN/CREAT SERPL: 13 (ref 12–20)
CALCIUM SERPL-MCNC: 9.1 MG/DL (ref 8.5–10.1)
CHLORIDE SERPL-SCNC: 106 MMOL/L (ref 97–108)
CHOLEST SERPL-MCNC: 196 MG/DL
CO2 SERPL-SCNC: 28 MMOL/L (ref 21–32)
CREAT SERPL-MCNC: 0.8 MG/DL (ref 0.55–1.02)
EST. AVERAGE GLUCOSE BLD GHB EST-MCNC: 120 MG/DL
GLOBULIN SER CALC-MCNC: 3.6 G/DL (ref 2–4)
GLUCOSE SERPL-MCNC: 112 MG/DL (ref 65–100)
HBA1C MFR BLD: 5.8 % (ref 4–5.6)
HDLC SERPL-MCNC: 54 MG/DL
HDLC SERPL: 3.6 (ref 0–5)
LDLC SERPL CALC-MCNC: 113.4 MG/DL (ref 0–100)
POTASSIUM SERPL-SCNC: 3.6 MMOL/L (ref 3.5–5.1)
PROT SERPL-MCNC: 7.2 G/DL (ref 6.4–8.2)
SODIUM SERPL-SCNC: 140 MMOL/L (ref 136–145)
T4 FREE SERPL-MCNC: 0.9 NG/DL (ref 0.8–1.5)
TRIGL SERPL-MCNC: 143 MG/DL
TSH SERPL DL<=0.05 MIU/L-ACNC: 3.74 UIU/ML (ref 0.36–3.74)
VLDLC SERPL CALC-MCNC: 28.6 MG/DL

## 2024-06-18 PROCEDURE — 99214 OFFICE O/P EST MOD 30 MIN: CPT | Performed by: INTERNAL MEDICINE

## 2024-06-18 PROCEDURE — 3079F DIAST BP 80-89 MM HG: CPT | Performed by: INTERNAL MEDICINE

## 2024-06-18 PROCEDURE — 3077F SYST BP >= 140 MM HG: CPT | Performed by: INTERNAL MEDICINE

## 2024-06-18 RX ORDER — AMLODIPINE BESYLATE 5 MG/1
5 TABLET ORAL
COMMUNITY

## 2024-06-18 RX ORDER — SEMAGLUTIDE 0.68 MG/ML
INJECTION, SOLUTION SUBCUTANEOUS
Qty: 3 ML | Refills: 0 | Status: SHIPPED | OUTPATIENT
Start: 2024-06-18

## 2024-06-18 RX ORDER — SEMAGLUTIDE 0.68 MG/ML
INJECTION, SOLUTION SUBCUTANEOUS
Qty: 3 ML | Refills: 0 | Status: SHIPPED | COMMUNITY
Start: 2024-06-18 | End: 2024-06-18

## 2024-06-18 RX ORDER — LEVOTHYROXINE SODIUM 0.07 MG/1
75 TABLET ORAL DAILY
Qty: 90 TABLET | Refills: 3 | Status: SHIPPED | OUTPATIENT
Start: 2024-06-18

## 2024-06-18 RX ORDER — LABETALOL 200 MG/1
TABLET, FILM COATED ORAL
Qty: 180 TABLET | Refills: 3 | Status: SHIPPED | OUTPATIENT
Start: 2024-06-18

## 2024-06-18 ASSESSMENT — PATIENT HEALTH QUESTIONNAIRE - PHQ9
SUM OF ALL RESPONSES TO PHQ QUESTIONS 1-9: 0
SUM OF ALL RESPONSES TO PHQ QUESTIONS 1-9: 0
1. LITTLE INTEREST OR PLEASURE IN DOING THINGS: NOT AT ALL
SUM OF ALL RESPONSES TO PHQ QUESTIONS 1-9: 0
SUM OF ALL RESPONSES TO PHQ9 QUESTIONS 1 & 2: 0
SUM OF ALL RESPONSES TO PHQ QUESTIONS 1-9: 0
2. FEELING DOWN, DEPRESSED OR HOPELESS: NOT AT ALL

## 2024-06-18 NOTE — PATIENT INSTRUCTIONS
SPECIFIC INSTRUCTIONS BELOW         DRINK water   Do not skip meals  Do not eat in between meals    Reduce carbs- pasta, rice, potatoes, bread   Try to avoid processed bread products like BISCUITS, CROISSANTS, MUFFINS    Do not drink juices or sodas, even if they are calorie zero or diet drinks and especially avoid using powders like crystalloids , MUNIR-AIDS     Do not eat peanut butter     Do not eat sugar free cookies and cakes   Do not eat peaches, oranges, pineapples, raisins, grapes , canteloupe , honey dew, mangoes , watermelon  and fruit medleys    ---------------------------------------------------------------------------------------    - medical weight loss     -  weight loss education     -  call plan for wegovy   after July 1 2024     ( Ozempic sample @ 0.25 mg a week )           --------------------------------------------------------------------------------------------------------------      Metformin  500 mg twice a day with meals           Unithroid 75 mcg  A day, on empty stomach with water only, no other meds or food or drinks   For next half hour   Take any kind of vitamins, calcium, iron   Pills  4 hours later                  -------------PAY ATTENTION TO THESE GENERAL INSTRUCTIONS -----------------      - The medications prescribed at this visit will not be available at pharmacy until 6 pm       - YOUR MED LIST IS NOT UP TO DATE AS SOME CHANGES ARE BEING MADE AFTER THE VISIT - FOLLOW SPECIFIC INSTRUCTIONS  ABOVE     -ANY tests other than blood work, which you opt to do  outside the  [facility] imaging facilities, you are responsible for prior authorizations if  required    - HEALTH MAINTENANCE IS NOT GOING TO BE UP TO DATE ON YOUR AVS- PLEASE IGNORE     Results     *Normal results will not be notified by a phone call starting January 1 2021   *If you have an upcoming visit, the results will be discussed at the visit   *Please sign up for MY CHART if you want access to your lab and test

## 2024-06-18 NOTE — PROGRESS NOTES
Identified pt with two pt identifiers(name and ). Reviewed record in preparation for visit and have obtained necessary documentation. All patient medications has been reviewed.  Chief Complaint   Patient presents with    Thyroid Problem    Diabetes       Vitals:    24 0855   BP: (!) 141/83   Pulse: 71   Resp: 20   SpO2: 95%                   Coordination of Care Questionnaire:   1) Have you been to an emergency room, urgent care, or hospitalized since your last visit?   Yes       2. Have seen or consulted any other health care provider since your last visit? No        Patient is accompanied by Mother I have received verbal consent from Magalie Daniels to discuss any/all medical information while they are present in the room.

## 2024-06-18 NOTE — PROGRESS NOTES
[facility] DIABETES AND ENDOCRINOLOGY              Sharon Major MD FACE         HISTORY OF PRESENT ILLNESS       Magalie Forrester is a 48    y.o. female.   HPI   ONE  year   follow up for  glucose intolerance, HYPOTHYRODISM ,  HTN  , obesity - s/p lap banding  ,  Pcos after  June 2023    Pt has suffered from  Sciatica recently,  getting steroids  -  on and off    Gained weight , requesting help to lose weight     Did not  get her usual labs done         June 2023     Gained 8 lbs   She finds that trulicity is not helping   C/o leg edema  on and off       Review of Systems   as above        Physical Exam   Constitutional: She is oriented to person, place, and time. She appears well-developed and well-nourished.   Psychiatric: She has a normal mood and affect.       Labs    Lab Results   Component Value Date    LABA1C 6.0 (H) 06/22/2023    LABA1C 5.9 (H) 11/18/2022    LABA1C 5.7 (H) 06/01/2022       Lab Results   Component Value Date     06/22/2023    K 3.7 06/22/2023     06/22/2023    CO2 24 06/22/2023    BUN 11 06/22/2023    CREATININE 0.85 06/22/2023    GLUCOSE 90 06/22/2023    CALCIUM 9.5 06/22/2023    LABALBU 19.2 06/22/2023    BILITOT 0.4 06/22/2023    ALKPHOS 62 06/22/2023    AST 32 06/22/2023    ALT 36 (H) 06/22/2023    LABGLOM 86 06/22/2023    GFRAA >60 06/01/2022    AGRATIO 1.3 06/22/2023    GLOB 3.7 06/01/2022    CHOL 190 06/22/2023    TRIG 110 06/22/2023     (H) 06/22/2023    HDL 54 06/22/2023        Lab Results   Component Value Date    MALBCR 4 06/22/2023          Thyroid    Lab Results   Component Value Date    TSH 3.760 06/22/2023    T4FREE 1.01 11/18/2022              ASSESSMENT and PLAN       1. HYPOTHYROIDISM  :    ON UNITHROID 75 MCG A DAY - await labs       2. PCOS / PREDIABETES  : A1C Is  ?  Unsure    compared to  6 %    from June 2023   compared to   5.9 %   from  nov 2022     Compared to   5.9 %  From nov 2021  Compared to    6.1 %   From jan 2020  Compared to

## 2024-06-21 RX ORDER — SEMAGLUTIDE 0.25 MG/.5ML
INJECTION, SOLUTION SUBCUTANEOUS
Qty: 2 ML | Refills: 5 | Status: SHIPPED | OUTPATIENT
Start: 2024-06-21

## 2024-07-16 DIAGNOSIS — R73.03 PREDIABETES: ICD-10-CM

## 2024-07-16 DIAGNOSIS — I10 ESSENTIAL (PRIMARY) HYPERTENSION: Primary | ICD-10-CM

## 2024-07-16 RX ORDER — CARVEDILOL 12.5 MG/1
TABLET ORAL
Refills: 0 | OUTPATIENT
Start: 2024-07-16

## 2024-10-29 ENCOUNTER — OFFICE VISIT (OUTPATIENT)
Age: 49
End: 2024-10-29
Payer: COMMERCIAL

## 2024-10-29 VITALS
BODY MASS INDEX: 47.09 KG/M2 | HEART RATE: 76 BPM | HEIGHT: 66 IN | WEIGHT: 293 LBS | TEMPERATURE: 97.6 F | OXYGEN SATURATION: 95 % | SYSTOLIC BLOOD PRESSURE: 142 MMHG | DIASTOLIC BLOOD PRESSURE: 89 MMHG

## 2024-10-29 DIAGNOSIS — E28.2 POLYCYSTIC OVARIAN SYNDROME: ICD-10-CM

## 2024-10-29 DIAGNOSIS — I10 ESSENTIAL (PRIMARY) HYPERTENSION: ICD-10-CM

## 2024-10-29 DIAGNOSIS — E55.9 VITAMIN D DEFICIENCY, UNSPECIFIED: ICD-10-CM

## 2024-10-29 DIAGNOSIS — E06.3 HYPOTHYROIDISM DUE TO HASHIMOTO'S THYROIDITIS: Primary | ICD-10-CM

## 2024-10-29 LAB
ALBUMIN SERPL-MCNC: 3.9 G/DL (ref 3.5–5)
ALBUMIN/GLOB SERPL: 1 (ref 1.1–2.2)
ALP SERPL-CCNC: 64 U/L (ref 45–117)
ALT SERPL-CCNC: 52 U/L (ref 12–78)
ANION GAP SERPL CALC-SCNC: 5 MMOL/L (ref 2–12)
AST SERPL-CCNC: 47 U/L (ref 15–37)
BILIRUB SERPL-MCNC: 0.4 MG/DL (ref 0.2–1)
BUN SERPL-MCNC: 10 MG/DL (ref 6–20)
BUN/CREAT SERPL: 11 (ref 12–20)
CALCIUM SERPL-MCNC: 9.5 MG/DL (ref 8.5–10.1)
CHLORIDE SERPL-SCNC: 106 MMOL/L (ref 97–108)
CHOLEST SERPL-MCNC: 218 MG/DL
CO2 SERPL-SCNC: 30 MMOL/L (ref 21–32)
CREAT SERPL-MCNC: 0.89 MG/DL (ref 0.55–1.02)
CREAT UR-MCNC: 106 MG/DL
EST. AVERAGE GLUCOSE BLD GHB EST-MCNC: 126 MG/DL
GLOBULIN SER CALC-MCNC: 3.9 G/DL (ref 2–4)
GLUCOSE SERPL-MCNC: 114 MG/DL (ref 65–100)
HBA1C MFR BLD: 6 % (ref 4–5.6)
HDLC SERPL-MCNC: 66 MG/DL
HDLC SERPL: 3.3 (ref 0–5)
LDLC SERPL CALC-MCNC: 120.4 MG/DL (ref 0–100)
MICROALBUMIN UR-MCNC: 0.67 MG/DL
MICROALBUMIN/CREAT UR-RTO: 6 MG/G (ref 0–30)
POTASSIUM SERPL-SCNC: 4 MMOL/L (ref 3.5–5.1)
PROT SERPL-MCNC: 7.8 G/DL (ref 6.4–8.2)
SODIUM SERPL-SCNC: 141 MMOL/L (ref 136–145)
T4 FREE SERPL-MCNC: 0.9 NG/DL (ref 0.8–1.5)
TRIGL SERPL-MCNC: 158 MG/DL
TSH SERPL DL<=0.05 MIU/L-ACNC: 2.87 UIU/ML (ref 0.36–3.74)
VLDLC SERPL CALC-MCNC: 31.6 MG/DL

## 2024-10-29 PROCEDURE — 99214 OFFICE O/P EST MOD 30 MIN: CPT | Performed by: INTERNAL MEDICINE

## 2024-10-29 PROCEDURE — 3077F SYST BP >= 140 MM HG: CPT | Performed by: INTERNAL MEDICINE

## 2024-10-29 PROCEDURE — 3079F DIAST BP 80-89 MM HG: CPT | Performed by: INTERNAL MEDICINE

## 2024-10-29 NOTE — PATIENT INSTRUCTIONS
SPECIFIC INSTRUCTIONS BELOW         DRINK water   Do not skip meals  Do not eat in between meals    Reduce carbs- pasta, rice, potatoes, bread   Try to avoid processed bread products like BISCUITS, CROISSANTS, MUFFINS    Do not drink juices or sodas, even if they are calorie zero or diet drinks and especially avoid using powders like crystalloids , MUNIR-AIDS     Do not eat peanut butter     Do not eat sugar free cookies and cakes   Do not eat peaches, oranges, pineapples, raisins, grapes , canteloupe , honey dew, mangoes , watermelon  and fruit medleys    ---------------------------------------------------------------------------------------    - medical weight loss     -  weight loss education         --------------------------------------------------------------------------------------------------------------      Metformin  500 mg twice a day with meals           Unithroid 75 mcg  A day, on empty stomach with water only, no other meds or food or drinks   For next half hour   Take any kind of vitamins, calcium, iron   Pills  4 hours later                  -------------PAY ATTENTION TO THESE GENERAL INSTRUCTIONS -----------------      - The medications prescribed at this visit will not be available at pharmacy until 6 pm       - YOUR MED LIST IS NOT UP TO DATE AS SOME CHANGES ARE BEING MADE AFTER THE VISIT - FOLLOW SPECIFIC INSTRUCTIONS  ABOVE     -ANY tests other than blood work, which you opt to do  outside the  [facility] facilities, you are responsible for prior authorizations if  required    - HEALTH MAINTENANCE IS NOT GOING TO BE UP TO DATE ON YOUR AVS- PLEASE IGNORE     Results     *Normal results will not be notified by a phone call starting January 1 2021   *If you have an upcoming visit, the results will be discussed at the visit   *Please sign up for MY CHART if you want access to your lab and test results  *Abnormal results which require immediate attention will be notified by phone call

## 2024-10-29 NOTE — PROGRESS NOTES
[facility] DIABETES AND ENDOCRINOLOGY              Sharon Major MD FACE         HISTORY OF PRESENT ILLNESS       Magalie Forrester is a 49   y.o. female.   HPI   6 M    follow up for  glucose intolerance, HYPOTHYRODISM ,  HTN  , obesity - s/p lap banding  ,  Pcos after  June 2024        LOST 10 lbs   Did not get labs done     June 2024   Pt has suffered from  Sciatica recently,  getting steroids  -  on and off    Gained weight , requesting help to lose weight  Did not  get her usual labs done       Review of Systems   as above        Physical Exam   Constitutional: She is oriented to person, place, and time. She appears well-developed and well-nourished.   Psychiatric: She has a normal mood and affect.       Labs    Lab Results   Component Value Date    LABA1C 5.8 (H) 06/18/2024    LABA1C 6.0 (H) 06/22/2023    LABA1C 5.9 (H) 11/18/2022     No New  labs       ASSESSMENT and PLAN       1. HYPOTHYROIDISM  :    ON UNITHROID 75 MCG A DAY -         2. PCOS / PREDIABETES  : A1C Is   ?     5.8%     from   June 2024      compared to  6 %    from June 2023   compared to   5.9 %   from  nov 2022     Compared to   5.9 %  From nov 2021  Compared to    6.1 %   From jan 2020  Compared to       5.8 %    FROM July 2018  COMPARED TO   6.1 %     From    today, April 2018 October 2024   Asking her to  stop  metformin 500 2 pills bid  because of GI distress       3. HTN :   controlled, on labetalol 200 mg tid,   benicar-hctz and norvasc 5 mg a day         4. Obesity S/p LAP BANDING 2006  : belviq did nto help her , made it worse   Body mass index is 48.68 kg/m².  Leonila got covered , but  could nto tolerate it   She definitely has several comorbid states to be on weight loss meds   She did not use   q-symia but cannot afford     Tried   Trulicity  with not much help     - weight loss education  - medical weight loss   - call plan for weight loss meds after July 1 onwards   Gave a sample of ozempic meanwhile

## 2024-10-29 NOTE — PROGRESS NOTES
Magalie Daniels is a 49 y.o. female here for   Chief Complaint   Patient presents with    Thyroid Problem       1. Have you been to the ER, urgent care clinic since your last visit?  Hospitalized since your last visit? -NO    2. Have you seen or consulted any other health care providers outside of the [facility] since your last visit?  Include any pap smears or colon screening.-Dr. Granda- pcp

## 2024-10-31 DIAGNOSIS — I10 ESSENTIAL (PRIMARY) HYPERTENSION: Primary | ICD-10-CM

## 2024-10-31 RX ORDER — OLMESARTAN MEDOXOMIL AND HYDROCHLOROTHIAZIDE 40/25 40; 25 MG/1; MG/1
1 TABLET ORAL DAILY
Qty: 90 TABLET | Refills: 3 | Status: SHIPPED | OUTPATIENT
Start: 2024-10-31

## 2025-04-30 LAB — HBA1C MFR BLD HPLC: 6.3 %

## 2025-05-14 NOTE — PROGRESS NOTES
HISTORY OF PRESENT ILLNESS   Josephine Henriquez is a 44 y.o. female. HPI   F/u for glucose intolerance, HYPOTHYRODISM AND HTN  AND obesity   Pcos after  May 2019     Gained   3 lbs   Here for labs   Not eaten     She is taken off spironolactone         Old history :    Pt is diagnosed with sleep apnea   Wearing c-pap since April 2019   Gained 5 lb   COMPLIANT WITH UNITHROID            Review of Systems   Constitutional: Negative. HENT: Negative. Eyes: Negative for pain and redness. Respiratory: Negative. Cardiovascular: Negative for chest pain, palpitations and leg swelling. Gastrointestinal: Negative. Negative for constipation. Genitourinary: Negative. Musculoskeletal: Negative for myalgias. Skin: Negative. Neurological: Negative. Endo/Heme/Allergies: Negative. Psychiatric/Behavioral: Negative for depression and memory loss. The patient does not have insomnia. Physical Exam   Constitutional: She is oriented to person, place, and time. She appears well-developed and well-nourished. HENT:   Head: Normocephalic. Eyes: Conjunctivae and extraocular motions are normal. Pupils are equal, round, and reactive to light. Neck: Normal range of motion. Neck supple. No JVD present. No tracheal deviation present. No thyromegaly present. Cardiovascular: Normal rate, regular rhythm and normal heart sounds. No murmur heard. Pulmonary/Chest: Breath sounds normal.   Abdominal: Soft. Bowel sounds are normal.   Musculoskeletal: Normal range of motion. Lymphadenopathy:   She has no cervical adenopathy. Neurological: She is alert and oriented to person, place, and time. She has normal reflexes. Skin: Skin is warm. Psychiatric: She has a normal mood and affect. Labs reviewed from pcp  Jan 2020           ASSESSMENT and PLAN       1. HYPOTHYROIDISM  :   EUTHYROID ON UNITHROID 75 MCG A DAY        2.  PCOS / PREDIABETES  : A1C   Is   6.1 %   From jan 2020  Compared to       5.8 %    FROM July 2018  COMPARED TO   6.1 %     From    toda y, April 2018 ,         Compared to     5.9 %     From   May 2017      5.8 %    From July 2016 labs ;    5.8 %    From    Oct 2015  Compared to   5.2 %    From    July 2015 compared to  5.9 %  Again from  March 2015     Continue on  metformin 500 mg ( 2 pills- 1 gm) bid   She is not hopeful of getting  pregnant again. ( got  )  Dietary compliance she has to maintained    . 3. HTN :  Not   Well controlled, on labetalol 200 mg tid, and atacand  32 -12.5 AND ( SHE IS off   SPIRNOLACTONE )  Infertility, secondary- s/p tubal adhesionolysis           4. Obesity S/p LAP BANDING 2006  : belviq did nto help her , made it worse   Body mass index is 48.87 kg/m². Kinjal Meade got covered , but  not using it as she had some bruises   She never tried  q-SYMIA  She definitely has several comorbid states to be on weight loss meds       5. Vit d def - otc supplements       6.  Irregular cycles - ON OCP       > 50 % of time is spent on counseling   Patient voiced understanding her plan of care       Labs today     F/u in 6 months weight-bearing as tolerated

## 2025-06-11 ENCOUNTER — PATIENT MESSAGE (OUTPATIENT)
Age: 50
End: 2025-06-11

## 2025-06-11 DIAGNOSIS — E06.3 HYPERTHYROIDISM WITH HASHIMOTO DISEASE: Primary | ICD-10-CM

## 2025-06-11 DIAGNOSIS — E28.2 POLYCYSTIC OVARIES: ICD-10-CM

## 2025-06-11 DIAGNOSIS — I10 HYPERTENSION, ESSENTIAL: ICD-10-CM

## 2025-06-11 DIAGNOSIS — E55.9 VITAMIN D DEFICIENCY: ICD-10-CM

## 2025-06-11 DIAGNOSIS — E05.80 HYPERTHYROIDISM WITH HASHIMOTO DISEASE: Primary | ICD-10-CM

## 2025-06-16 ENCOUNTER — OFFICE VISIT (OUTPATIENT)
Age: 50
End: 2025-06-16
Payer: COMMERCIAL

## 2025-06-16 VITALS
RESPIRATION RATE: 20 BRPM | SYSTOLIC BLOOD PRESSURE: 137 MMHG | DIASTOLIC BLOOD PRESSURE: 85 MMHG | BODY MASS INDEX: 47.09 KG/M2 | TEMPERATURE: 98.3 F | HEART RATE: 79 BPM | OXYGEN SATURATION: 96 % | WEIGHT: 293 LBS | HEIGHT: 66 IN

## 2025-06-16 DIAGNOSIS — E28.2 POLYCYSTIC OVARIAN SYNDROME: ICD-10-CM

## 2025-06-16 DIAGNOSIS — E03.8 OTHER SPECIFIED HYPOTHYROIDISM: ICD-10-CM

## 2025-06-16 DIAGNOSIS — E55.9 VITAMIN D DEFICIENCY, UNSPECIFIED: ICD-10-CM

## 2025-06-16 DIAGNOSIS — I10 ESSENTIAL (PRIMARY) HYPERTENSION: Primary | ICD-10-CM

## 2025-06-16 DIAGNOSIS — R53.82 CHRONIC FATIGUE: ICD-10-CM

## 2025-06-16 PROCEDURE — 3075F SYST BP GE 130 - 139MM HG: CPT | Performed by: INTERNAL MEDICINE

## 2025-06-16 PROCEDURE — 99214 OFFICE O/P EST MOD 30 MIN: CPT | Performed by: INTERNAL MEDICINE

## 2025-06-16 PROCEDURE — 3079F DIAST BP 80-89 MM HG: CPT | Performed by: INTERNAL MEDICINE

## 2025-06-16 RX ORDER — ALBUTEROL SULFATE 90 UG/1
2 INHALANT RESPIRATORY (INHALATION) 2 TIMES DAILY
COMMUNITY
Start: 2024-10-31

## 2025-06-16 RX ORDER — METHYLPREDNISOLONE 4 MG/1
2 TABLET ORAL DAILY
COMMUNITY

## 2025-06-16 RX ORDER — LABETALOL 200 MG/1
TABLET, FILM COATED ORAL
Qty: 180 TABLET | Refills: 3 | Status: SHIPPED | OUTPATIENT
Start: 2025-06-16

## 2025-06-16 RX ORDER — SPIRONOLACTONE 25 MG/1
25 TABLET ORAL DAILY
COMMUNITY
Start: 2025-06-03 | End: 2025-07-03

## 2025-06-16 RX ORDER — LEVOTHYROXINE SODIUM 75 UG/1
75 TABLET ORAL DAILY
Qty: 90 TABLET | Refills: 3 | Status: SHIPPED | OUTPATIENT
Start: 2025-06-16

## 2025-06-16 ASSESSMENT — PATIENT HEALTH QUESTIONNAIRE - PHQ9
SUM OF ALL RESPONSES TO PHQ QUESTIONS 1-9: 0
2. FEELING DOWN, DEPRESSED OR HOPELESS: NOT AT ALL
1. LITTLE INTEREST OR PLEASURE IN DOING THINGS: NOT AT ALL

## 2025-06-16 NOTE — PROGRESS NOTES
Magalie Daniels is a 49 y.o. female here for   Chief Complaint   Patient presents with    Thyroid Problem       1. Have you been to the ER, urgent care clinic since your last visit?  Hospitalized since your last visit? -Patient 1st - heel spur    2. Have you seen or consulted any other health care providers outside of the [facility] since your last visit?  Include any pap smears or colon screening. GYN - Weight loss

## 2025-06-16 NOTE — PROGRESS NOTES
[facility] DIABETES AND ENDOCRINOLOGY              Sharon Major MD FACE         HISTORY OF PRESENT ILLNESS       Magalie Forrester is a 49   y.o. female.   HPI   6 M    follow up for  glucose intolerance, HYPOTHYRODISM ,  HTN  , obesity - s/p lap banding  ,  Pcos after October 2024        She had labs at  PCP, where cortisol looked low and she googled and wanted to know about It   Lost 3 lbs   Right foot  heel spur inflammed y-day and got steroids     Says has not started on Zepbound  ( approved by [employer] )        October 2024     LOST 10 lbs   Did not get labs done     June 2024   Pt has suffered from  Sciatica recently,  getting steroids  -  on and off    Gained weight , requesting help to lose weight  Did not  get her usual labs done       Review of Systems   as above        Physical Exam   Constitutional: She is oriented to person, place, and time. She appears well-developed and well-nourished.   Right foot  - had heeled spur  inflammation  and  she is using cane       Labs    From pcp dated June 8 2025 reviewed     Cortisol   5.9 low    TT < 3   A1c is 6.3 %   from June 2025         Lab Results   Component Value Date    LABA1C 6.0 (H) 10/29/2024    LABA1C 5.8 (H) 06/18/2024    LABA1C 6.0 (H) 06/22/2023           ASSESSMENT and PLAN       1. HYPOTHYROIDISM  :    ON UNITHROID 75 MCG A DAY -         2. PCOS / PREDIABETES  : A1C Is   6.3 %   from  June 2025  compared to      5.8%     from   June 2024      compared to  6 %    from June 2023   compared to   5.9 %   from  nov 2022     Compared to   5.9 %  From nov 2021  Compared to    6.1 %   From jan 2020  Compared to       5.8 %    FROM July 2018  COMPARED TO   6.1 %     From    today, April 2018 June 2025  -    She is staying   on metformin  , has side effects  - STOP     October 2024   Asking her to  stop  metformin 500 2 pills bid  because of GI distress       3. HTN :   controlled, on labetalol 200 mg tid,   benicar-hctz ; off   norvasc 5 mg a day

## 2025-06-16 NOTE — PATIENT INSTRUCTIONS
SPECIFIC INSTRUCTIONS BELOW       Stop Metformin          Unithroid 75 mcg  A day, on empty stomach with water only, no other meds or food or drinks   For next half hour   Take any kind of vitamins, calcium, iron   Pills  4 hours later                  -------------PAY ATTENTION TO THESE GENERAL INSTRUCTIONS -----------------      - The medications prescribed at this visit will not be available at pharmacy until 6 pm       - YOUR MED LIST IS NOT UP TO DATE AS SOME CHANGES ARE BEING MADE AFTER THE VISIT - FOLLOW SPECIFIC INSTRUCTIONS  ABOVE     -ANY tests other than blood work, which you opt to do  outside the  [facility] facilities, you are responsible for prior authorizations if  required    - HEALTH MAINTENANCE IS NOT GOING TO BE UP TO DATE ON YOUR AVS- PLEASE IGNORE     Results     *Normal results will not be notified by a phone call starting January 1 2021   *If you have an upcoming visit, the results will be discussed at the visit   *Please sign up for MY CHART if you want access to your lab and test results  *Abnormal results which require immediate attention will be notified by phone call   *Abnormal results which do not require immediate assistance will be notified in 1-2 weeks       Refills    -    have your pharmacy send us a refill request . Refills are done max for one year and a visit is a must before refills are extended    Follow up appointments -  highly encourage you to make it when you are checking out. We can accommodate you into the schedule based on your clinical situation, but not for extending refills beyond a year. Labs are important to give refills and is important to get labs before the visit     Phone calls  -  Allow  24 hrs. for non-urgent calls to be returned  Prior authorization - It may take 2-4 weeks to process  Forms  -  FMLA, DMV etc., will take up to 2 weeks to process  Cancellations - please notify the office 2 days in advance   Samples  - will only be dispensed at